# Patient Record
Sex: FEMALE | Race: WHITE | NOT HISPANIC OR LATINO | Employment: PART TIME | URBAN - METROPOLITAN AREA
[De-identification: names, ages, dates, MRNs, and addresses within clinical notes are randomized per-mention and may not be internally consistent; named-entity substitution may affect disease eponyms.]

---

## 2017-11-03 ENCOUNTER — OFFICE VISIT (OUTPATIENT)
Dept: URGENT CARE | Facility: CLINIC | Age: 37
End: 2017-11-03
Payer: COMMERCIAL

## 2017-11-03 PROCEDURE — 99203 OFFICE O/P NEW LOW 30 MIN: CPT

## 2018-01-18 NOTE — PROGRESS NOTES
Assessment    1  Acute sinusitis (461 9) (J01 90)   2  Breast pain, right (051 71) (N64 4)    Plan  Acute sinusitis    · Amoxicillin 500 MG Oral Capsule; TAKE 1 CAPSULE 3 TIMES DAILY UNTIL GONE    Discussion/Summary  Discussion Summary:   Take antibiotic as prescribed  follow up with ob-gyn for breast pain  seek emergent care if breast pain worsens  Medication Side Effects Reviewed: Possible side effects of new medications were reviewed with the patient/guardian today  Understands and agrees with treatment plan: The treatment plan was reviewed with the patient/guardian  The patient/guardian understands and agrees with the treatment plan   Counseling Documentation With Imm: The patient, patient's family was counseled regarding instructions for management, patient and family education, importance of compliance with treatment  Chief Complaint    1  Cold Symptoms  Chief Complaint Free Text Note Form: Patient relates started with right ear feeling clogged for 3 days and sinus symptoms  Denies fever  Also, pain to right nipple  Denies discharge from nipple  History of Present Illness  HPI: 46yo F p/w R ear clogged x 3 days with nasal congestion x 1 week and pain in R nipple x 1 day  Pt denies n/v/d, sob/wheezing, fever/chills, breast discharge, breast swelling, breast redness  Hospital Based Practices Required Assessment: Reason DV Screen not done: family at bedside    Depression And Suicide Screen  Reason suicide screen not done: family at bedside  Prefered Language is  english  Primary Language is  english  Review of Systems  Focused-Female:   Constitutional: No fever, no chills, feels well, no tiredness, no recent weight gain or loss  ENT: earache and nasal discharge, but no nosebleeds, no sore throat, no hearing loss and no hoarseness  Cardiovascular: no complaints of slow or fast heart rate, no chest pain, no palpitations, no leg claudication or lower extremity edema     Respiratory: no complaints of shortness of breath, no wheezing, no dyspnea on exertion, no orthopnea or PND  Breasts: breast pain, but no breast swelling, no reddening of the breast, no breast lump, no breast itching and no nipple discharge  Gastrointestinal: no complaints of abdominal pain, no constipation, no nausea or diarrhea, no vomiting, no bloody stools  Genitourinary: no complaints of dysuria, no incontinence, no pelvic pain, no dysmenorrhea, no vaginal discharge or abnormal vaginal bleeding  Musculoskeletal: no complaints of arthralgia, no myalgia, no joint swelling or stiffness, no limb pain or swelling  Integumentary: no complaints of skin rash or lesion, no itching or dry skin, no skin wounds  Neurological: no complaints of headache, no confusion, no numbness or tingling, no dizziness or fainting  ROS Reviewed:   ROS reviewed  Active Problems    1  Acute sinusitis (461 9) (J01 90)   2  Breast pain, right (611 71) (N64 4)    Past Medical History    1  No pertinent past medical history  Active Problems And Past Medical History Reviewed: The active problems and past medical history were reviewed and updated today  Family History  Family History Reviewed: The family history was reviewed and updated today  Social History    · Never a smoker  Social History Reviewed: The social history was reviewed and is unchanged  Surgical History    1  History of Inguinal Hernia Repair  Surgical History Reviewed: The surgical history was reviewed and updated today  Current Meds   1  No Reported Medications Recorded  Medication List Reviewed: The medication list was reviewed and updated today  Allergies    1   Lexapro TABS   2  Zoloft TABS    Vitals  Signs   Recorded: [id number] 04:35PM   Temperature: 98 3 F, Tympanic  Heart Rate: 78  Respiration: 18  Systolic: 288, LUE, Sitting  Diastolic: 60, LUE, Sitting  Height: 5 ft 7 in  Weight: 142 lb 4 oz  BMI Calculated: 22 28  BSA Calculated: 1 75  O2 Saturation: 98, RA  Pain Scale: 5    Physical Exam    Constitutional   General appearance: No acute distress, well appearing and well nourished  Ears, Nose, Mouth, and Throat   External inspection of ears and nose: Normal     Otoscopic examination: Tympanic membranes translucent with normal light reflex  Canals patent without erythema  Nasal mucosa, septum, and turbinates: Abnormal   normal nasal septum, no intranasal masses or polyps and normal nasal turbinates  There was a purulent discharge from both nares  The bilateral nasal mucosa was edematous  Oropharynx: Normal with no erythema, edema, exudate or lesions  Pulmonary   Respiratory effort: No increased work of breathing or signs of respiratory distress  Auscultation of lungs: Clear to auscultation  no rales or crackles were heard bilaterally  no rhonchi  no friction rub  no wheezing  no diminished breath sounds  Cardiovascular   Palpation of heart: Normal PMI, no thrills  Auscultation of heart: Normal rate and rhythm, normal S1 and S2, without murmurs  Examination of extremities for edema and/or varicosities: Normal     Abdomen   Abdomen: Non-tender, no masses  Liver and spleen: No hepatomegaly or splenomegaly  Lymphatic   Palpation of lymph nodes in neck: Abnormal   bilateral anterior cervical node enlargement, but no posterior cervical node enlargement  Musculoskeletal   Gait and station: Normal     Digits and nails: Normal without clubbing or cyanosis  Inspection/palpation of joints, bones, and muscles: Normal     Skin   Skin and subcutaneous tissue: Normal without rashes or lesions  Psychiatric   Orientation to person, place, and time: Normal     Mood and affect: Normal     Additional Exam:  breast exam normal; TTP beneath nipple; no palpable masses; no enlarged or tender lymph nodes; no erythema/edema        Signatures   Electronically signed by : RUFUS Grubbs; Nov  3 2017  5:16PM EST (Author)    Electronically signed by : MARISOL Gurrola ; Nov 6 2017 10:32AM EST                       (Co-author)

## 2020-06-28 ENCOUNTER — HOSPITAL ENCOUNTER (INPATIENT)
Facility: HOSPITAL | Age: 40
LOS: 1 days | Discharge: HOME | DRG: 603 | End: 2020-06-30
Attending: EMERGENCY MEDICINE | Admitting: INTERNAL MEDICINE
Payer: COMMERCIAL

## 2020-06-28 ENCOUNTER — APPOINTMENT (EMERGENCY)
Dept: ULTRASOUND IMAGING | Facility: HOSPITAL | Age: 40
DRG: 603 | End: 2020-06-28
Payer: COMMERCIAL

## 2020-06-28 DIAGNOSIS — L03.116 CELLULITIS OF LEFT LOWER EXTREMITY: Primary | ICD-10-CM

## 2020-06-28 PROBLEM — L03.90 CELLULITIS: Status: ACTIVE | Noted: 2020-06-28

## 2020-06-28 LAB
ANION GAP SERPL CALCULATED.3IONS-SCNC: 7 MMOL/L (ref 4–13)
BASOPHILS # BLD AUTO: 0.03 THOUSANDS/ΜL (ref 0–0.1)
BASOPHILS NFR BLD AUTO: 1 % (ref 0–1)
BUN SERPL-MCNC: 16 MG/DL (ref 5–25)
CALCIUM SERPL-MCNC: 9 MG/DL (ref 8.3–10.1)
CHLORIDE SERPL-SCNC: 104 MMOL/L (ref 100–108)
CO2 SERPL-SCNC: 29 MMOL/L (ref 21–32)
CREAT SERPL-MCNC: 0.78 MG/DL (ref 0.6–1.3)
EOSINOPHIL # BLD AUTO: 0.03 THOUSAND/ΜL (ref 0–0.61)
EOSINOPHIL NFR BLD AUTO: 1 % (ref 0–6)
ERYTHROCYTE [DISTWIDTH] IN BLOOD BY AUTOMATED COUNT: 12.2 % (ref 11.6–15.1)
EXT PREG TEST URINE: NEGATIVE
EXT. CONTROL ED NAV: NORMAL
GFR SERPL CREATININE-BSD FRML MDRD: 95 ML/MIN/1.73SQ M
GLUCOSE SERPL-MCNC: 96 MG/DL (ref 65–140)
HCT VFR BLD AUTO: 41.1 % (ref 34.8–46.1)
HGB BLD-MCNC: 13.7 G/DL (ref 11.5–15.4)
IMM GRANULOCYTES # BLD AUTO: 0.02 THOUSAND/UL (ref 0–0.2)
IMM GRANULOCYTES NFR BLD AUTO: 0 % (ref 0–2)
LACTATE SERPL-SCNC: 0.8 MMOL/L (ref 0.5–2)
LYMPHOCYTES # BLD AUTO: 0.85 THOUSANDS/ΜL (ref 0.6–4.47)
LYMPHOCYTES NFR BLD AUTO: 19 % (ref 14–44)
MCH RBC QN AUTO: 29.3 PG (ref 26.8–34.3)
MCHC RBC AUTO-ENTMCNC: 33.3 G/DL (ref 31.4–37.4)
MCV RBC AUTO: 88 FL (ref 82–98)
MONOCYTES # BLD AUTO: 0.52 THOUSAND/ΜL (ref 0.17–1.22)
MONOCYTES NFR BLD AUTO: 12 % (ref 4–12)
NEUTROPHILS # BLD AUTO: 3.06 THOUSANDS/ΜL (ref 1.85–7.62)
NEUTS SEG NFR BLD AUTO: 67 % (ref 43–75)
NRBC BLD AUTO-RTO: 0 /100 WBCS
PLATELET # BLD AUTO: 172 THOUSANDS/UL (ref 149–390)
PMV BLD AUTO: 9.5 FL (ref 8.9–12.7)
POTASSIUM SERPL-SCNC: 4.1 MMOL/L (ref 3.5–5.3)
RBC # BLD AUTO: 4.67 MILLION/UL (ref 3.81–5.12)
SODIUM SERPL-SCNC: 140 MMOL/L (ref 136–145)
WBC # BLD AUTO: 4.51 THOUSAND/UL (ref 4.31–10.16)

## 2020-06-28 PROCEDURE — 96365 THER/PROPH/DIAG IV INF INIT: CPT

## 2020-06-28 PROCEDURE — 81025 URINE PREGNANCY TEST: CPT | Performed by: EMERGENCY MEDICINE

## 2020-06-28 PROCEDURE — 85025 COMPLETE CBC W/AUTO DIFF WBC: CPT | Performed by: EMERGENCY MEDICINE

## 2020-06-28 PROCEDURE — 76882 US LMTD JT/FCL EVL NVASC XTR: CPT

## 2020-06-28 PROCEDURE — 87040 BLOOD CULTURE FOR BACTERIA: CPT | Performed by: EMERGENCY MEDICINE

## 2020-06-28 PROCEDURE — 83605 ASSAY OF LACTIC ACID: CPT | Performed by: EMERGENCY MEDICINE

## 2020-06-28 PROCEDURE — 99220 PR INITIAL OBSERVATION CARE/DAY 70 MINUTES: CPT | Performed by: INTERNAL MEDICINE

## 2020-06-28 PROCEDURE — 80048 BASIC METABOLIC PNL TOTAL CA: CPT | Performed by: EMERGENCY MEDICINE

## 2020-06-28 PROCEDURE — 36415 COLL VENOUS BLD VENIPUNCTURE: CPT | Performed by: EMERGENCY MEDICINE

## 2020-06-28 PROCEDURE — 99285 EMERGENCY DEPT VISIT HI MDM: CPT | Performed by: EMERGENCY MEDICINE

## 2020-06-28 PROCEDURE — 99285 EMERGENCY DEPT VISIT HI MDM: CPT

## 2020-06-28 PROCEDURE — 96367 TX/PROPH/DG ADDL SEQ IV INF: CPT

## 2020-06-28 RX ORDER — ACETAMINOPHEN 325 MG/1
650 TABLET ORAL ONCE AS NEEDED
Status: DISCONTINUED | OUTPATIENT
Start: 2020-06-28 | End: 2020-06-28

## 2020-06-28 RX ORDER — IBUPROFEN 600 MG/1
600 TABLET ORAL ONCE
Status: COMPLETED | OUTPATIENT
Start: 2020-06-29 | End: 2020-06-29

## 2020-06-28 RX ORDER — DIPHENHYDRAMINE HYDROCHLORIDE 50 MG/ML
25 INJECTION INTRAMUSCULAR; INTRAVENOUS ONCE
Status: COMPLETED | OUTPATIENT
Start: 2020-06-28 | End: 2020-06-28

## 2020-06-28 RX ORDER — CEFAZOLIN SODIUM 1 G/50ML
1000 SOLUTION INTRAVENOUS EVERY 8 HOURS
Status: DISCONTINUED | OUTPATIENT
Start: 2020-06-28 | End: 2020-06-30 | Stop reason: HOSPADM

## 2020-06-28 RX ORDER — SODIUM CHLORIDE 9 MG/ML
75 INJECTION, SOLUTION INTRAVENOUS CONTINUOUS
Status: DISCONTINUED | OUTPATIENT
Start: 2020-06-28 | End: 2020-06-29

## 2020-06-28 RX ORDER — OXYCODONE HYDROCHLORIDE 5 MG/1
5 TABLET ORAL EVERY 6 HOURS PRN
Status: DISCONTINUED | OUTPATIENT
Start: 2020-06-28 | End: 2020-06-30 | Stop reason: HOSPADM

## 2020-06-28 RX ORDER — CLINDAMYCIN PHOSPHATE 600 MG/50ML
600 INJECTION INTRAVENOUS ONCE
Status: COMPLETED | OUTPATIENT
Start: 2020-06-28 | End: 2020-06-28

## 2020-06-28 RX ORDER — VANCOMYCIN HYDROCHLORIDE 1 G/200ML
15 INJECTION, SOLUTION INTRAVENOUS ONCE
Status: COMPLETED | OUTPATIENT
Start: 2020-06-28 | End: 2020-06-28

## 2020-06-28 RX ORDER — HYDROMORPHONE HCL/PF 1 MG/ML
0.5 SYRINGE (ML) INJECTION EVERY 6 HOURS PRN
Status: DISCONTINUED | OUTPATIENT
Start: 2020-06-28 | End: 2020-06-29

## 2020-06-28 RX ORDER — HEPARIN SODIUM 5000 [USP'U]/ML
5000 INJECTION, SOLUTION INTRAVENOUS; SUBCUTANEOUS EVERY 8 HOURS SCHEDULED
Status: DISCONTINUED | OUTPATIENT
Start: 2020-06-28 | End: 2020-06-30 | Stop reason: HOSPADM

## 2020-06-28 RX ORDER — ACETAMINOPHEN 325 MG/1
650 TABLET ORAL EVERY 6 HOURS PRN
Status: DISCONTINUED | OUTPATIENT
Start: 2020-06-28 | End: 2020-06-30 | Stop reason: HOSPADM

## 2020-06-28 RX ADMIN — CEFAZOLIN SODIUM 1000 MG: 1 SOLUTION INTRAVENOUS at 14:08

## 2020-06-28 RX ADMIN — HYDROMORPHONE HYDROCHLORIDE 0.5 MG: 1 INJECTION, SOLUTION INTRAMUSCULAR; INTRAVENOUS; SUBCUTANEOUS at 19:43

## 2020-06-28 RX ADMIN — CLINDAMYCIN PHOSPHATE 600 MG: 600 INJECTION, SOLUTION INTRAVENOUS at 10:13

## 2020-06-28 RX ADMIN — DIPHENHYDRAMINE HYDROCHLORIDE 25 MG: 50 INJECTION, SOLUTION INTRAMUSCULAR; INTRAVENOUS at 23:32

## 2020-06-28 RX ADMIN — SODIUM CHLORIDE 1000 ML: 0.9 INJECTION, SOLUTION INTRAVENOUS at 09:45

## 2020-06-28 RX ADMIN — SODIUM CHLORIDE 75 ML/HR: 0.9 INJECTION, SOLUTION INTRAVENOUS at 23:32

## 2020-06-28 RX ADMIN — CEFAZOLIN SODIUM 1000 MG: 1 SOLUTION INTRAVENOUS at 21:06

## 2020-06-28 RX ADMIN — ACETAMINOPHEN 650 MG: 325 TABLET ORAL at 13:41

## 2020-06-28 RX ADMIN — VANCOMYCIN HYDROCHLORIDE 1000 MG: 1 INJECTION, SOLUTION INTRAVENOUS at 10:42

## 2020-06-28 RX ADMIN — OXYCODONE HYDROCHLORIDE 5 MG: 5 TABLET ORAL at 16:36

## 2020-06-28 RX ADMIN — SODIUM CHLORIDE 75 ML/HR: 0.9 INJECTION, SOLUTION INTRAVENOUS at 12:34

## 2020-06-28 RX ADMIN — VANCOMYCIN HYDROCHLORIDE 1250 MG: 5 INJECTION, POWDER, LYOPHILIZED, FOR SOLUTION INTRAVENOUS at 22:20

## 2020-06-28 NOTE — PROGRESS NOTES
Vancomycin Assessment    Hansa Paez is a 36 y o  female who is currently receiving vancomycin 1250 mg IV every 12 hours for cellulitis  Relevant clinical data and objective history reviewed:  Creatinine   Date Value Ref Range Status   06/28/2020 0 78 0 60 - 1 30 mg/dL Final     Comment:     Standardized to IDMS reference method     /76   Pulse 78   Temp 98 °F (36 7 °C) (Temporal)   Resp 18   Ht 5' 7" (1 702 m)   Wt 65 8 kg (145 lb)   LMP  (LMP Unknown)   SpO2 98%   BMI 22 71 kg/m²   No intake/output data recorded  Lab Results   Component Value Date/Time    BUN 16 06/28/2020 09:44 AM    WBC 4 51 06/28/2020 09:44 AM    HGB 13 7 06/28/2020 09:44 AM    HCT 41 1 06/28/2020 09:44 AM    MCV 88 06/28/2020 09:44 AM     06/28/2020 09:44 AM     Temp Readings from Last 3 Encounters:   06/28/20 98 °F (36 7 °C) (Temporal)     Vancomycin Days of Therapy: 1    Assessment/Plan  The patient is currently on vancomycin utilizing scheduled dosing based on actual body weight  Baseline risks associated with therapy include: concomitant nephrotoxic medications and dehydration  The patient received a one time dose of 1000 mg IV in the ED and will now receive 1250 mg IV every 12 hours  Pharmacy will also follow closely for s/sx of nephrotoxicity, infusion reactions and appropriateness of therapy  BMP and CBC will be ordered per protocol  Plan for trough as patient approaches steady state, prior to the 5th  dose at approximately 1030 on 6/30/20  Due to infection severity, will target a trough of 10-15  Pharmacy will continue to follow the patients culture results and clinical progress daily      Delonte Padgett, Pharmacist

## 2020-06-28 NOTE — ED PROVIDER NOTES
History  Chief Complaint   Patient presents with    Cellulitis     patient has cellulitis on her right thigh for about a week, was seen at urgent care and given abx  followed up with them today and told to come to the ED for evalution  24-year-old female with a past medical history of cellulitis presents with worsening cellulitis to left leg x1 week  Patient states that about a week ago she was playing in her child's backyard pool  The next day, she noticed a small pimple to the left medial proximal thigh  Pimple and grew in size and she thought perhaps it was from an ingrown hair  She saw her primary care provider on June 24th and was prescribed Bactrim for 10 days  She was subsequently switched to Keflex and was seen at the urgent care today for worsening cellulitis and was sent to the emergency department for further evaluation  Patient denies fever, chills, nausea, vomiting, chest pain or shortness of breath  Patient has not had MRSA abscesses or cellulitis before  Dr Nitin Grady wore PPE during clinical evaluation including face mask, eye goggles and gloves        History provided by:  Patient   used: No    Rash   Location:  Leg  Leg rash location:  L upper leg  Quality: draining, painful and redness    Quality: not blistering, not bruising, not burning, not dry, not itchy, not peeling, not scaling, not swelling and not weeping    Pain details:     Quality:  Throbbing    Severity:  Moderate    Onset quality:  Gradual    Duration:  1 week    Timing:  Intermittent    Progression:  Waxing and waning  Severity:  Moderate  Onset quality:  Gradual  Duration:  1 week  Timing:  Constant  Progression:  Spreading  Chronicity:  New  Context: not animal contact, not chemical exposure, not diapers, not eggs, not exposure to similar rash, not food, not hot tub use, not insect bite/sting, not medications, not new detergent/soap, not nuts, not plant contact, not pollen, not pregnancy, not sick contacts and not sun exposure    Relieved by:  Nothing  Worsened by:  Contact and heat  Associated symptoms: induration    Associated symptoms: no abdominal pain, no diarrhea, no fatigue, no fever, no headaches, no hoarse voice, no joint pain, no myalgias, no nausea, no periorbital edema, no shortness of breath, no sore throat, no throat swelling, no tongue swelling, no URI, not vomiting and not wheezing        None       History reviewed  No pertinent past medical history  Past Surgical History:   Procedure Laterality Date    TUBAL LIGATION         History reviewed  No pertinent family history  I have reviewed and agree with the history as documented  E-Cigarette/Vaping    E-Cigarette Use Never User      E-Cigarette/Vaping Substances     Social History     Tobacco Use    Smoking status: Never Smoker    Smokeless tobacco: Never Used   Substance Use Topics    Alcohol use: Never     Frequency: Never    Drug use: Never       Review of Systems   Constitutional: Negative for chills, diaphoresis, fatigue and fever  HENT: Negative for congestion, drooling, facial swelling, hoarse voice, nosebleeds, sneezing, sore throat, trouble swallowing and voice change  Eyes: Negative for photophobia, pain and visual disturbance  Respiratory: Negative for cough, chest tightness, shortness of breath and wheezing  Cardiovascular: Negative for chest pain, palpitations and leg swelling  Gastrointestinal: Negative for abdominal pain, constipation, diarrhea, nausea and vomiting  Genitourinary: Negative for decreased urine volume, difficulty urinating, dysuria, flank pain, frequency, hematuria, urgency, vaginal bleeding and vaginal discharge  Musculoskeletal: Negative for arthralgias, back pain, myalgias, neck pain and neck stiffness  Skin: Positive for color change  Negative for pallor, rash and wound  Allergic/Immunologic: Negative for immunocompromised state     Neurological: Negative for dizziness, tremors, seizures, syncope, facial asymmetry, speech difficulty, weakness, light-headedness, numbness and headaches  Hematological: Negative for adenopathy  Psychiatric/Behavioral: Negative for agitation, confusion, hallucinations and suicidal ideas  The patient is not nervous/anxious  Physical Exam  Physical Exam   Constitutional: She is oriented to person, place, and time  Vital signs are normal  She appears well-developed and well-nourished  She is cooperative  Non-toxic appearance  She does not have a sickly appearance  She does not appear ill  No distress  HENT:   Head: Normocephalic and atraumatic  Right Ear: Hearing, tympanic membrane, external ear and ear canal normal    Left Ear: Hearing, tympanic membrane, external ear and ear canal normal    Nose: Nose normal  Right sinus exhibits no maxillary sinus tenderness and no frontal sinus tenderness  Left sinus exhibits no maxillary sinus tenderness and no frontal sinus tenderness  Mouth/Throat: Uvula is midline, oropharynx is clear and moist and mucous membranes are normal  No oropharyngeal exudate, posterior oropharyngeal edema, posterior oropharyngeal erythema or tonsillar abscesses  Eyes: Pupils are equal, round, and reactive to light  Conjunctivae, EOM and lids are normal    Neck: Trachea normal, normal range of motion, full passive range of motion without pain and phonation normal  Neck supple  No thyroid mass and no thyromegaly present  Cardiovascular: Normal rate, regular rhythm, S1 normal, S2 normal, normal heart sounds, intact distal pulses and normal pulses  Pulses:       Radial pulses are 2+ on the right side, and 2+ on the left side  Dorsalis pedis pulses are 2+ on the right side, and 2+ on the left side  Pulmonary/Chest: Effort normal and breath sounds normal  No accessory muscle usage or stridor  No tachypnea  No respiratory distress  She has no decreased breath sounds  She has no wheezes  She has no rhonchi   She has no rales  She exhibits no mass, no tenderness, no deformity and no retraction  Abdominal: Soft  Bowel sounds are normal  She exhibits no distension, no ascites and no mass  There is no hepatosplenomegaly  There is no tenderness  There is no rigidity, no rebound, no guarding, no CVA tenderness, no tenderness at McBurney's point and negative Carrasquillo's sign  No hernia  Hernia confirmed negative in the right inguinal area and confirmed negative in the left inguinal area  Genitourinary:       Pelvic exam was performed with patient supine  Genitourinary Comments: Large area of erythema with central area of induration, tenderness and drainage to left proximal medial thigh  Area of cellulitis marked with a pen  Yellowish discharge on gauze  Musculoskeletal: Normal range of motion  She exhibits no edema, tenderness or deformity  Lymphadenopathy:     She has no cervical adenopathy  No inguinal adenopathy noted on the right or left side  Neurological: She is alert and oriented to person, place, and time  She has normal strength  She is not disoriented  She displays no atrophy and no tremor  No cranial nerve deficit or sensory deficit  She exhibits normal muscle tone  She displays a negative Romberg sign  She displays no seizure activity  Coordination and gait normal  GCS eye subscore is 4  GCS verbal subscore is 5  GCS motor subscore is 6  Patient is AAOx4, GCS 15; speaking clearly and appropriately; motor and sensation intact; visual fields intact; cranial nerves II-XII grossly intact; no facial droop, slurred speech or arm drift   Skin: Skin is warm, dry and intact  Capillary refill takes less than 2 seconds  No abrasion, no bruising, no burn, no ecchymosis, no laceration, no lesion, no petechiae and no rash noted  Rash is not maculopapular  She is not diaphoretic  There is erythema  No pallor  Psychiatric: She has a normal mood and affect   Her speech is normal and behavior is normal  Judgment and thought content normal  She is not actively hallucinating  Cognition and memory are normal  She is attentive  Nursing note and vitals reviewed        Vital Signs  ED Triage Vitals   Temperature Pulse Respirations Blood Pressure SpO2   06/28/20 0913 06/28/20 0913 06/28/20 0913 06/28/20 0913 06/28/20 0914   97 5 °F (36 4 °C) 88 16 118/68 97 %      Temp Source Heart Rate Source Patient Position - Orthostatic VS BP Location FiO2 (%)   06/28/20 0913 06/28/20 0913 06/28/20 0914 06/28/20 0914 --   Temporal Monitor Sitting Right arm       Pain Score       06/28/20 0914       3           Vitals:    06/28/20 1145 06/28/20 1200 06/28/20 1220 06/28/20 1539   BP:  123/74 117/76 114/77   Pulse: 84 94 78 92   Patient Position - Orthostatic VS:  Lying           Visual Acuity      ED Medications  Medications   sodium chloride 0 9 % infusion (75 mL/hr Intravenous New Bag 6/28/20 1234)   vancomycin (VANCOCIN) 1,250 mg in sodium chloride 0 9 % 250 mL IVPB (has no administration in time range)   ceFAZolin (ANCEF) IVPB (premix) 1,000 mg 50 mL (1,000 mg Intravenous New Bag 6/28/20 1408)   acetaminophen (TYLENOL) tablet 650 mg (650 mg Oral Given 6/28/20 1341)   heparin (porcine) subcutaneous injection 5,000 Units (5,000 Units Subcutaneous Refused 6/28/20 1342)   oxyCODONE (ROXICODONE) IR tablet 5 mg (5 mg Oral Given 6/28/20 1636)   HYDROmorphone (DILAUDID) injection 0 5 mg (has no administration in time range)   sodium chloride 0 9 % bolus 1,000 mL (0 mL Intravenous Stopped 6/28/20 1018)   vancomycin (VANCOCIN) IVPB (premix) 1,000 mg 200 mL (0 mg/kg × 65 8 kg Intravenous Stopped 6/28/20 1131)   clindamycin (CLEOCIN) IVPB (premix) 600 mg 50 mL (0 mg Intravenous Stopped 6/28/20 1042)       Diagnostic Studies  Results Reviewed     Procedure Component Value Units Date/Time    Blood culture #1 [[id number]] Collected:  06/28/20 0944    Lab Status:  Preliminary result Specimen:  Blood from Arm, Left Updated:  06/28/20 1601     Blood Culture Received in Microbiology Lab  Culture in Progress  Blood culture #2 [[id number]] Collected:  06/28/20 0944    Lab Status:  Preliminary result Specimen:  Blood from Arm, Right Updated:  06/28/20 1601     Blood Culture Received in Microbiology Lab  Culture in Progress  POCT pregnancy, urine [[id number]]  (Normal) Resulted:  06/28/20 1017    Lab Status:  Final result Updated:  06/28/20 1017     EXT PREG TEST UR (Ref: Negative) negative     Control valid    Lactic acid, plasma [[id number]]  (Normal) Collected:  06/28/20 0944    Lab Status:  Final result Specimen:  Blood from Arm, Right Updated:  06/28/20 1010     LACTIC ACID 0 8 mmol/L     Narrative:       Result may be elevated if tourniquet was used during collection      Basic metabolic panel [[id number]] Collected:  06/28/20 0944    Lab Status:  Final result Specimen:  Blood from Arm, Right Updated:  06/28/20 1000     Sodium 140 mmol/L      Potassium 4 1 mmol/L      Chloride 104 mmol/L      CO2 29 mmol/L      ANION GAP 7 mmol/L      BUN 16 mg/dL      Creatinine 0 78 mg/dL      Glucose 96 mg/dL      Calcium 9 0 mg/dL      eGFR 95 ml/min/1 73sq m     Narrative:       [place] guidelines for Chronic Kidney Disease (CKD):     Stage 1 with normal or high GFR (GFR > 90 mL/min/1 73 square meters)    Stage 2 Mild CKD (GFR = 60-89 mL/min/1 73 square meters)    Stage 3A Moderate CKD (GFR = 45-59 mL/min/1 73 square meters)    Stage 3B Moderate CKD (GFR = 30-44 mL/min/1 73 square meters)    Stage 4 Severe CKD (GFR = 15-29 mL/min/1 73 square meters)    Stage 5 End Stage CKD (GFR <15 mL/min/1 73 square meters)  Note: GFR calculation is accurate only with a steady state creatinine    CBC and differential [[id number]] Collected:  06/28/20 0944    Lab Status:  Final result Specimen:  Blood from Arm, Right Updated:  06/28/20 0951     WBC 4 51 Thousand/uL      RBC 4 67 Million/uL      Hemoglobin 13 7 g/dL      Hematocrit 41 1 %      MCV 88 fL      MCH 29 3 pg MCHC 33 3 g/dL      RDW 12 2 %      MPV 9 5 fL      Platelets 397 Thousands/uL      nRBC 0 /100 WBCs      Neutrophils Relative 67 %      Immat GRANS % 0 %      Lymphocytes Relative 19 %      Monocytes Relative 12 %      Eosinophils Relative 1 %      Basophils Relative 1 %      Neutrophils Absolute 3 06 Thousands/µL      Immature Grans Absolute 0 02 Thousand/uL      Lymphocytes Absolute 0 85 Thousands/µL      Monocytes Absolute 0 52 Thousand/µL      Eosinophils Absolute 0 03 Thousand/µL      Basophils Absolute 0 03 Thousands/µL                  US extremity soft tissue   Final Result by Abhi Emanuel MD (06/28 1243)      Diffuse soft tissue swelling corresponding to area of clinical concern but no evidence of organized fluid collection to suggest focal abscess         Workstation performed: [id number]                    Procedures  POC MSK/Soft Tissue US  Date/Time: 6/28/2020 11:35 AM  Performed by: Leandro Raza MD  Authorized by: Leandro Raza MD     Patient location:  Bedside  Performed by: Attending  Other Assisting Provider: No    Procedure:     Performed: soft tissue ultrasound    Procedure details:     Exam Type:  Diagnostic    Longitudinal view:  Obtained    Transverse view:  Obtained    Image quality: diagnostic      Image availability:  Not saved  Soft tissue ultrasound:     Soft tissue indications: swelling, pain and erythema      Anatomic location:  Lower extremity    Soft tissue findings: cobblestoning    Interpretation:     Soft tissue impressions: consistent with cellulitis               ED Course  ED Course as of Jun 28 1640   Sun Jun 28, 2020   0958 Spoke with Dr Leanna Montelongo, Radiologist who approves US soft tissues to rule out abscess  Magali Royal 182 with Farzaneh Ward (cell [phone]) who will come to do ultrasound  Will be an hour or so as she comes from [place] [id number] Hcg negative      80 Texted Dr Macy Hines, Hospitalist for MS observation for worsening cellulitis of E and possible abscess  US soft tissue ordered  May need Surgery consult  IV clindamycin and vancomycin ordered in ED       1057 Dr Sariah Pereyra accepts patient for failed outpatient antibiotics for cellulitis of LLE  Aware that patient will get a US soft tissues and may need Surgery consult for I&D       1100 Updated patient on plan for admission  3000 [facility] at bedside  700 [facility] she did not see abscess, only severe cellulitis which is consistent with my bedside ultrasound  There was significant "cobblestoning" of epidermis        [address] soft tissue:IMPRESSION:     Diffuse soft tissue swelling corresponding to area of clinical concern but no evidence of organized fluid collection to suggest focal abscess             MDM  Number of Diagnoses or Management Options  Cellulitis of left lower extremity:      Amount and/or Complexity of Data Reviewed  Clinical lab tests: reviewed and ordered  Tests in the radiology section of CPT®: reviewed and ordered  Tests in the medicine section of CPT®: ordered and reviewed  Review and summarize past medical records: yes  Discuss the patient with other providers: yes (Hospitalist, Dr Aaron Mendoza)  Independent visualization of images, tracings, or specimens: yes (Ultrasound soft tissue)    Risk of Complications, Morbidity, and/or Mortality  Presenting problems: moderate  Diagnostic procedures: moderate  Management options: moderate    Patient Progress  Patient progress: stable        Disposition  Final diagnoses:   Cellulitis of left lower extremity     Time reflects when diagnosis was documented in both MDM as applicable and the Disposition within this note     Time User Action Codes Description Comment    6/28/2020 10:06 AM Arnie Zhao Add [L03 116] Cellulitis of left lower extremity       ED Disposition     ED Disposition Condition Date/Time Comment    Admit Stable Sun Jun 28, 2020 10:06 AM Case was discussed with Dr Sariah Pereyra and the patient's admission status was agreed to be Admission Status: observation status to the service of Dr Sheikh Shows          Follow-up Information    None         There are no discharge medications for this patient  No discharge procedures on file      PDMP Review     None          ED Provider  Electronically Signed by      MD Paddy Springer MD  06/28/20 1640

## 2020-06-28 NOTE — H&P
H&P- Eileen Ace 1980, 36 y o  female MRN: [id number]    Unit/Bed#: -01 Encounter: [id number]    Primary Care Provider: Bernard Hopkins MD   Date and time admitted to hospital: 6/28/2020  9:08 AM    * Cellulitis  Assessment & Plan  Presents with left perineal region cellulitis  Ultrasound done and showed no evidence of abscess collection  Continue with cefazolin and vancomycin  Tylenol for pain  Continue gentle hydration  Arnie cellulitic region and follow daily    VTE Prophylaxis: Heparin  / sequential compression device   Code Status:  Full  POLST: There is no POLST form on file for this patient (pre-hospital)    Anticipated Length of Stay:  Patient will be admitted on an Observation basis with an anticipated length of stay of  less than 2 midnights  Justification for Hospital Stay:  Cellulitis    Total Time for Visit, including Counseling / Coordination of Care: 45 minutes  Greater than 50% of this total time spent on direct patient counseling and coordination of care  History of Present Illness:    Eileen Ace is a 36 y o  female who presents with left groin region swelling and erythema for the last 1 week  Patient was being treated for left perineal region cellulitis with clindamycin and Bactrim as an outpatient to no avail  Denies fever, chills  Review of Systems:    Review of Systems    Past Medical and Surgical History:     History reviewed  No pertinent past medical history  Past Surgical History:   Procedure Laterality Date    TUBAL LIGATION         Meds/Allergies:    Prior to Admission medications    Not on File     I have reviewed home medications with patient personally  Allergies:    Allergies   Allergen Reactions    Escitalopram     Fluoxetine     Sertraline      antidepressants      Vancomycin      Itching scalp    Morphine Rash       Social History:     Substance Use History:   Social History     Substance and Sexual Activity   Alcohol Use Never    Frequency: Never     Social History     Tobacco Use   Smoking Status Never Smoker   Smokeless Tobacco Never Used     Social History     Substance and Sexual Activity   Drug Use Never       Family History:    non-contributory    Physical Exam:     Vitals:   Blood Pressure: 117/76 (06/28/20 1220)  Pulse: 78 (06/28/20 1220)  Temperature: 98 °F (36 7 °C) (06/28/20 1220)  Temp Source: Temporal (06/28/20 1220)  Respirations: 18 (06/28/20 1220)  Height: 5' 7" (170 2 cm) (06/28/20 1220)  Weight - Scale: 65 8 kg (145 lb) (06/28/20 0914)  SpO2: 98 % (06/28/20 1220)    Physical Exam    General appearance: alert, appears stated age and cooperative  Skin: Skin color, texture, turgor normal  No rashes or lesions  Head: Normocephalic, without obvious abnormality, atraumatic  Eyes: conjunctivae/corneas clear  PERRL, EOM's intact  Lungs: clear to auscultation bilaterally  Heart: regular rate and rhythm, S1, S2 normal, no murmur, click, rub or gallop  Abdomen: soft, non-tender; bowel sounds normal; no masses,  no organomegaly  Back: symmetric, no curvature  ROM normal  No CVA tenderness  Extremities: Left perineum erythema and induration, no loculated abscess  Neurologic: Alert and oriented X 3, normal strength and tone  Normal symmetric reflexes  Normal coordination and gait  Psychiatric:  Normal mood and affect    Additional Data:     Lab Results: I have personally reviewed pertinent reports        Results from last 7 days   Lab Units 06/28/20  0944   WBC Thousand/uL 4 51   HEMOGLOBIN g/dL 13 7   HEMATOCRIT % 41 1   PLATELETS Thousands/uL 172   NEUTROS PCT % 67   LYMPHS PCT % 19   MONOS PCT % 12   EOS PCT % 1     Results from last 7 days   Lab Units 06/28/20  0944   SODIUM mmol/L 140   POTASSIUM mmol/L 4 1   CHLORIDE mmol/L 104   CO2 mmol/L 29   BUN mg/dL 16   CREATININE mg/dL 0 78   ANION GAP mmol/L 7   CALCIUM mg/dL 9 0   GLUCOSE RANDOM mg/dL 96                 Results from last 7 days   Lab Units 06/28/20  0944   LACTIC ACID mmol/L 0 8       Imaging: I have personally reviewed pertinent reports  US extremity soft tissue   Final Result by Ronan Wheeler MD (06/28 1243)      Diffuse soft tissue swelling corresponding to area of clinical concern but no evidence of organized fluid collection to suggest focal abscess         Workstation performed: [id number]             EKG, Pathology, and Other Studies Reviewed on Admission: yes    Allscripts / Epic Records Reviewed: Yes     ** Please Note: This note has been constructed using a voice recognition system   **

## 2020-06-28 NOTE — PLAN OF CARE
Problem: SKIN/TISSUE INTEGRITY - ADULT  Goal: Skin integrity remains intact  Description  INTERVENTIONS  - Identify patients at risk for skin breakdown  - Assess and monitor skin integrity  - Assess and monitor nutrition and hydration status  - Monitor labs (i e  albumin)  - Assess for incontinence   - Turn and reposition patient  - Assist with mobility/ambulation  - Relieve pressure over bony prominences  - Avoid friction and shearing  - Provide appropriate hygiene as needed including keeping skin clean and dry  - Evaluate need for skin moisturizer/barrier cream  - Collaborate with interdisciplinary team (i e  Nutrition, Rehabilitation, etc )   - Patient/family teaching  Outcome: Progressing  Goal: Incision(s), wounds(s) or drain site(s) healing without S/S of infection  Description  INTERVENTIONS  - Assess and document risk factors for skin impairment   - Assess and document dressing, incision, wound bed, drain sites and surrounding tissue  - Consider nutrition services referral as needed  - Oral mucous membranes remain intact  - Provide patient/ family education  Outcome: Progressing  Goal: Oral mucous membranes remain intact  Description  INTERVENTIONS  - Assess oral mucosa and hygiene practices  - Implement preventative oral hygiene regimen  - Implement oral medicated treatments as ordered  - Initiate Nutrition services referral as needed  Outcome: Progressing     Problem: PAIN - ADULT  Goal: Verbalizes/displays adequate comfort level or baseline comfort level  Description  Interventions:  - Encourage patient to monitor pain and request assistance  - Assess pain using appropriate pain scale  - Administer analgesics based on type and severity of pain and evaluate response  - Implement non-pharmacological measures as appropriate and evaluate response  - Consider cultural and social influences on pain and pain management  - Notify physician/advanced practitioner if interventions unsuccessful or patient reports new pain  Outcome: Progressing     Problem: INFECTION - ADULT  Goal: Absence or prevention of progression during hospitalization  Description  INTERVENTIONS:  - Assess and monitor for signs and symptoms of infection  - Monitor lab/diagnostic results  - Monitor all insertion sites, i e  indwelling lines, tubes, and drains  - Monitor endotracheal if appropriate and nasal secretions for changes in amount and color  - [place] appropriate cooling/warming therapies per order  - Administer medications as ordered  - Instruct and encourage patient and family to use good hand hygiene technique  - Identify and instruct in appropriate isolation precautions for identified infection/condition  Outcome: Progressing  Goal: Absence of fever/infection during neutropenic period  Description  INTERVENTIONS:  - Monitor WBC    Outcome: Progressing     Problem: SAFETY ADULT  Goal: Patient will remain free of falls  Description  INTERVENTIONS:  - Assess patient frequently for physical needs  -  Identify cognitive and physical deficits and behaviors that affect risk of falls    -  [place] fall precautions as indicated by assessment   - Educate patient/family on patient safety including physical limitations  - Instruct patient to call for assistance with activity based on assessment  - Modify environment to reduce risk of injury  - Consider OT/PT consult to assist with strengthening/mobility  Outcome: Progressing  Goal: Maintain or return to baseline ADL function  Description  INTERVENTIONS:  -  Assess patient's ability to carry out ADLs; assess patient's baseline for ADL function and identify physical deficits which impact ability to perform ADLs (bathing, care of mouth/teeth, toileting, grooming, dressing, etc )  - Assess/evaluate cause of self-care deficits   - Assess range of motion  - Assess patient's mobility; develop plan if impaired  - Assess patient's need for assistive devices and provide as appropriate  - Encourage maximum independence but intervene and supervise when necessary  - Involve family in performance of ADLs  - Assess for home care needs following discharge   - Consider OT consult to assist with ADL evaluation and planning for discharge  - Provide patient education as appropriate  Outcome: Progressing  Goal: Maintain or return mobility status to optimal level  Description  INTERVENTIONS:  - Assess patient's baseline mobility status (ambulation, transfers, stairs, etc )    - Identify cognitive and physical deficits and behaviors that affect mobility  - Identify mobility aids required to assist with transfers and/or ambulation (gait belt, sit-to-stand, lift, walker, cane, etc )  - [place] fall precautions as indicated by assessment  - Record patient progress and toleration of activity level on Mobility SBAR; progress patient to next Phase/Stage  - Instruct patient to call for assistance with activity based on assessment  - Consider rehabilitation consult to assist with strengthening/weightbearing, etc   Outcome: Progressing

## 2020-06-28 NOTE — ASSESSMENT & PLAN NOTE
Presents with left perineal region cellulitis  Ultrasound done and showed no evidence of abscess collection  Continue with cefazolin and vancomycin  Tylenol for pain  Continue gentle hydration  Arnie cellulitic region and follow daily

## 2020-06-28 NOTE — LETTER
[facility]  [address] [place] [address]  Dept: [phone]    June 30, 2020     Patient: Miguelina Azevedo   YOB: 1980   Date of Visit: 6/28/2020       To Whom it May Concern:    Jeison Pennington is under my professional care  She was seen in the hospital from 6/28/2020   to 06/30/20  She may return to work on 7/6/2020 without limitations  If you have any questions or concerns, please don't hesitate to call           Sincerely,          Shane Barriga MD

## 2020-06-29 LAB
ALBUMIN SERPL BCP-MCNC: 3 G/DL (ref 3.5–5)
ALP SERPL-CCNC: 48 U/L (ref 46–116)
ALT SERPL W P-5'-P-CCNC: 43 U/L (ref 12–78)
ANION GAP SERPL CALCULATED.3IONS-SCNC: 3 MMOL/L (ref 4–13)
AST SERPL W P-5'-P-CCNC: 33 U/L (ref 5–45)
BASOPHILS # BLD AUTO: 0.03 THOUSANDS/ΜL (ref 0–0.1)
BASOPHILS NFR BLD AUTO: 1 % (ref 0–1)
BILIRUB SERPL-MCNC: 0.34 MG/DL (ref 0.2–1)
BUN SERPL-MCNC: 9 MG/DL (ref 5–25)
CALCIUM SERPL-MCNC: 7.9 MG/DL (ref 8.3–10.1)
CHLORIDE SERPL-SCNC: 105 MMOL/L (ref 100–108)
CO2 SERPL-SCNC: 31 MMOL/L (ref 21–32)
CREAT SERPL-MCNC: 0.85 MG/DL (ref 0.6–1.3)
EOSINOPHIL # BLD AUTO: 0.08 THOUSAND/ΜL (ref 0–0.61)
EOSINOPHIL NFR BLD AUTO: 2 % (ref 0–6)
ERYTHROCYTE [DISTWIDTH] IN BLOOD BY AUTOMATED COUNT: 12.1 % (ref 11.6–15.1)
GFR SERPL CREATININE-BSD FRML MDRD: 86 ML/MIN/1.73SQ M
GLUCOSE P FAST SERPL-MCNC: 99 MG/DL (ref 65–99)
GLUCOSE SERPL-MCNC: 99 MG/DL (ref 65–140)
HCT VFR BLD AUTO: 38.4 % (ref 34.8–46.1)
HGB BLD-MCNC: 12.6 G/DL (ref 11.5–15.4)
IMM GRANULOCYTES # BLD AUTO: 0.04 THOUSAND/UL (ref 0–0.2)
IMM GRANULOCYTES NFR BLD AUTO: 1 % (ref 0–2)
LYMPHOCYTES # BLD AUTO: 1.54 THOUSANDS/ΜL (ref 0.6–4.47)
LYMPHOCYTES NFR BLD AUTO: 32 % (ref 14–44)
MAGNESIUM SERPL-MCNC: 1.7 MG/DL (ref 1.6–2.6)
MCH RBC QN AUTO: 29.4 PG (ref 26.8–34.3)
MCHC RBC AUTO-ENTMCNC: 32.8 G/DL (ref 31.4–37.4)
MCV RBC AUTO: 90 FL (ref 82–98)
MONOCYTES # BLD AUTO: 0.69 THOUSAND/ΜL (ref 0.17–1.22)
MONOCYTES NFR BLD AUTO: 15 % (ref 4–12)
NEUTROPHILS # BLD AUTO: 2.38 THOUSANDS/ΜL (ref 1.85–7.62)
NEUTS SEG NFR BLD AUTO: 49 % (ref 43–75)
NRBC BLD AUTO-RTO: 0 /100 WBCS
PLATELET # BLD AUTO: 176 THOUSANDS/UL (ref 149–390)
PMV BLD AUTO: 9.8 FL (ref 8.9–12.7)
POTASSIUM SERPL-SCNC: 4.1 MMOL/L (ref 3.5–5.3)
PROT SERPL-MCNC: 6.4 G/DL (ref 6.4–8.2)
RBC # BLD AUTO: 4.29 MILLION/UL (ref 3.81–5.12)
SODIUM SERPL-SCNC: 139 MMOL/L (ref 136–145)
WBC # BLD AUTO: 4.76 THOUSAND/UL (ref 4.31–10.16)

## 2020-06-29 PROCEDURE — 83735 ASSAY OF MAGNESIUM: CPT | Performed by: INTERNAL MEDICINE

## 2020-06-29 PROCEDURE — 85025 COMPLETE CBC W/AUTO DIFF WBC: CPT | Performed by: INTERNAL MEDICINE

## 2020-06-29 PROCEDURE — 80053 COMPREHEN METABOLIC PANEL: CPT | Performed by: INTERNAL MEDICINE

## 2020-06-29 PROCEDURE — 99226 PR SBSQ OBSERVATION CARE/DAY 35 MINUTES: CPT | Performed by: INTERNAL MEDICINE

## 2020-06-29 RX ORDER — DOXYCYCLINE HYCLATE 100 MG/1
100 CAPSULE ORAL EVERY 12 HOURS SCHEDULED
Status: DISCONTINUED | OUTPATIENT
Start: 2020-06-29 | End: 2020-06-30 | Stop reason: HOSPADM

## 2020-06-29 RX ORDER — HYDROMORPHONE HCL/PF 1 MG/ML
0.5 SYRINGE (ML) INJECTION EVERY 4 HOURS PRN
Status: DISCONTINUED | OUTPATIENT
Start: 2020-06-29 | End: 2020-06-30 | Stop reason: HOSPADM

## 2020-06-29 RX ORDER — IBUPROFEN 600 MG/1
600 TABLET ORAL EVERY 6 HOURS PRN
Status: DISCONTINUED | OUTPATIENT
Start: 2020-06-29 | End: 2020-06-30 | Stop reason: HOSPADM

## 2020-06-29 RX ADMIN — HYDROMORPHONE HYDROCHLORIDE 0.5 MG: 1 INJECTION, SOLUTION INTRAMUSCULAR; INTRAVENOUS; SUBCUTANEOUS at 02:24

## 2020-06-29 RX ADMIN — IBUPROFEN 600 MG: 600 TABLET ORAL at 16:31

## 2020-06-29 RX ADMIN — CEFAZOLIN SODIUM 1000 MG: 1 SOLUTION INTRAVENOUS at 05:48

## 2020-06-29 RX ADMIN — IBUPROFEN 600 MG: 600 TABLET ORAL at 00:39

## 2020-06-29 RX ADMIN — CEFAZOLIN SODIUM 1000 MG: 1 SOLUTION INTRAVENOUS at 14:06

## 2020-06-29 RX ADMIN — DOXYCYCLINE 100 MG: 100 CAPSULE ORAL at 21:16

## 2020-06-29 RX ADMIN — IBUPROFEN 600 MG: 600 TABLET ORAL at 22:35

## 2020-06-29 RX ADMIN — DOXYCYCLINE 100 MG: 100 CAPSULE ORAL at 09:52

## 2020-06-29 RX ADMIN — IBUPROFEN 600 MG: 600 TABLET ORAL at 10:15

## 2020-06-29 RX ADMIN — CEFAZOLIN SODIUM 1000 MG: 1 SOLUTION INTRAVENOUS at 21:16

## 2020-06-29 NOTE — PLAN OF CARE
Problem: SKIN/TISSUE INTEGRITY - ADULT  Goal: Skin integrity remains intact  Description  INTERVENTIONS  - Identify patients at risk for skin breakdown  - Assess and monitor skin integrity  - Assess and monitor nutrition and hydration status  - Monitor labs (i e  albumin)  - Assess for incontinence   - Turn and reposition patient  - Assist with mobility/ambulation  - Relieve pressure over bony prominences  - Avoid friction and shearing  - Provide appropriate hygiene as needed including keeping skin clean and dry  - Evaluate need for skin moisturizer/barrier cream  - Collaborate with interdisciplinary team (i e  Nutrition, Rehabilitation, etc )   - Patient/family teaching  Outcome: Progressing  Goal: Incision(s), wounds(s) or drain site(s) healing without S/S of infection  Description  INTERVENTIONS  - Assess and document risk factors for skin impairment   - Assess and document dressing, incision, wound bed, drain sites and surrounding tissue  - Consider nutrition services referral as needed  - Oral mucous membranes remain intact  - Provide patient/ family education  Outcome: Progressing  Goal: Oral mucous membranes remain intact  Description  INTERVENTIONS  - Assess oral mucosa and hygiene practices  - Implement preventative oral hygiene regimen  - Implement oral medicated treatments as ordered  - Initiate Nutrition services referral as needed  Outcome: Progressing     Problem: PAIN - ADULT  Goal: Verbalizes/displays adequate comfort level or baseline comfort level  Description  Interventions:  - Encourage patient to monitor pain and request assistance  - Assess pain using appropriate pain scale  - Administer analgesics based on type and severity of pain and evaluate response  - Implement non-pharmacological measures as appropriate and evaluate response  - Consider cultural and social influences on pain and pain management  - Notify physician/advanced practitioner if interventions unsuccessful or patient reports new pain  Outcome: Progressing     Problem: INFECTION - ADULT  Goal: Absence or prevention of progression during hospitalization  Description  INTERVENTIONS:  - Assess and monitor for signs and symptoms of infection  - Monitor lab/diagnostic results  - Monitor all insertion sites, i e  indwelling lines, tubes, and drains  - Monitor endotracheal if appropriate and nasal secretions for changes in amount and color  - [place] appropriate cooling/warming therapies per order  - Administer medications as ordered  - Instruct and encourage patient and family to use good hand hygiene technique  - Identify and instruct in appropriate isolation precautions for identified infection/condition  Outcome: Progressing  Goal: Absence of fever/infection during neutropenic period  Description  INTERVENTIONS:  - Monitor WBC    Outcome: Progressing     Problem: SAFETY ADULT  Goal: Patient will remain free of falls  Description  INTERVENTIONS:  - Assess patient frequently for physical needs  -  Identify cognitive and physical deficits and behaviors that affect risk of falls    -  [place] fall precautions as indicated by assessment   - Educate patient/family on patient safety including physical limitations  - Instruct patient to call for assistance with activity based on assessment  - Modify environment to reduce risk of injury  - Consider OT/PT consult to assist with strengthening/mobility  Outcome: Progressing  Goal: Maintain or return to baseline ADL function  Description  INTERVENTIONS:  -  Assess patient's ability to carry out ADLs; assess patient's baseline for ADL function and identify physical deficits which impact ability to perform ADLs (bathing, care of mouth/teeth, toileting, grooming, dressing, etc )  - Assess/evaluate cause of self-care deficits   - Assess range of motion  - Assess patient's mobility; develop plan if impaired  - Assess patient's need for assistive devices and provide as appropriate  - Encourage maximum independence but intervene and supervise when necessary  - Involve family in performance of ADLs  - Assess for home care needs following discharge   - Consider OT consult to assist with ADL evaluation and planning for discharge  - Provide patient education as appropriate  Outcome: Progressing  Goal: Maintain or return mobility status to optimal level  Description  INTERVENTIONS:  - Assess patient's baseline mobility status (ambulation, transfers, stairs, etc )    - Identify cognitive and physical deficits and behaviors that affect mobility  - Identify mobility aids required to assist with transfers and/or ambulation (gait belt, sit-to-stand, lift, walker, cane, etc )  - [place] fall precautions as indicated by assessment  - Record patient progress and toleration of activity level on Mobility SBAR; progress patient to next Phase/Stage  - Instruct patient to call for assistance with activity based on assessment  - Consider rehabilitation consult to assist with strengthening/weightbearing, etc   Outcome: Progressing

## 2020-06-29 NOTE — PROGRESS NOTES
Progress Note - Miguelina Azevedo 1980, 36 y o  female MRN: [id number]    Unit/Bed#: -01 Encounter: [id number]    Primary Care Provider: Ben Armijo MD   Date and time admitted to hospital: 2020  9:08 AM    * Cellulitis  Assessment & Plan  Presents with left perineal region cellulitis  Ultrasound done and showed no evidence of abscess collection  Continue with cefazolin and doxycycline  Patient had rash for vancomycin and discontinued  Cellulitis has significantly improved however pain still persists  Manage pain as per pain scale  DC IV hydration  Arnie cellulitic region and follow daily    VTE Pharmacologic Prophylaxis:   Pharmacologic: Heparin    Patient Centered Rounds: I have performed bedside rounds with nursing staff today    Discussions with Specialists or Other Care Team Provider:     Education and Discussions with Family / Patient:       Current Length of Stay: 0 day(s)    Current Patient Status: Inpatient   Certification Statement: The patient will continue to require additional inpatient hospital stay due to Cellulitis    Discharge Plan:  Tomorrow    Code Status: Level 1 - Full Code      Subjective:   Still complaining of pain however rash has significantly improved    Objective:     Vitals:   Temp (24hrs), Av 5 °F (36 9 °C), Min:97 8 °F (36 6 °C), Max:99 1 °F (37 3 °C)    Temp:  [97 8 °F (36 6 °C)-99 1 °F (37 3 °C)] 97 8 °F (36 6 °C)  HR:  [78-94] 84  Resp:  [18-20] 18  BP: (113-123)/(67-77) 114/67  SpO2:  [97 %-100 %] 98 %  Body mass index is 22 71 kg/m²  Input and Output Summary (last 24 hours):        Intake/Output Summary (Last 24 hours) at 2020 1034  Last data filed at 2020 0947  Gross per 24 hour   Intake  58 ml   Output    Net  58 ml       Physical Exam:     General appearance: alert, appears stated age and cooperative  Head: Normocephalic, without obvious abnormality, atraumatic  Lungs: clear to auscultation bilaterally  Heart: regular rate and rhythm  Abdomen: soft, non-tender, positive bowel sounds   Back: negative  Extremities: extremities atraumatic, no cyanosis or edema  Neurologic: Alert and oriented X 3, normal strength and tone  Normal symmetric reflexes  Normal coordination and gait    Additional Data:     Labs:    Results from last 7 days   Lab Units 06/29/20  0503   WBC Thousand/uL 4 76   HEMOGLOBIN g/dL 12 6   HEMATOCRIT % 38 4   PLATELETS Thousands/uL 176   NEUTROS PCT % 49   LYMPHS PCT % 32   MONOS PCT % 15*   EOS PCT % 2     Results from last 7 days   Lab Units 06/29/20  0503   SODIUM mmol/L 139   POTASSIUM mmol/L 4 1   CHLORIDE mmol/L 105   CO2 mmol/L 31   BUN mg/dL 9   CREATININE mg/dL 0 85   ANION GAP mmol/L 3*   CALCIUM mg/dL 7 9*   ALBUMIN g/dL 3 0*   TOTAL BILIRUBIN mg/dL 0 34   ALK PHOS U/L 48   ALT U/L 43   AST U/L 33   GLUCOSE RANDOM mg/dL 99                 Results from last 7 days   Lab Units 06/28/20  0944   LACTIC ACID mmol/L 0 8           * I Have Reviewed All Lab Data Listed Above  * Additional Pertinent Lab Tests Reviewed: [address] Admission Reviewed    Imaging:    Imaging Reports Reviewed Today Include: images reviewed    Recent Cultures (last 7 days):     Results from last 7 days   Lab Units 06/28/20  0944   BLOOD CULTURE  Received in Microbiology Lab  Culture in Progress  Received in Microbiology Lab  Culture in Progress         Last 24 Hours Medication List:     Current Facility-Administered Medications:  acetaminophen 650 mg Oral Q6H PRN Alethea Armijo MD    cefazolin 1,000 mg Intravenous Q8H Alethea Armijo MD Last Rate: 1,000 mg (06/29/20 0548)   doxycycline hyclate 100 mg Oral Q12H [address] Alethea Armijo MD    heparin (porcine) 5,000 Units Subcutaneous [facility] Alethea Armijo MD    HYDROmorphone 0 5 mg Intravenous Q4H PRN Alethea Armijo MD    ibuprofen 600 mg Oral Q6H PRN Alethea Armijo MD    oxyCODONE 5 mg Oral Q6H PRN Alethea Armijo MD         Today, Patient Was Seen By: Alethea Armijo MD    ** Please Note: Dictation voice to text software may have been used in the creation of this document   **

## 2020-06-29 NOTE — NURSING NOTE
Patient developed hives near IV site during Vancomycin administration  Vancomycin was stopped  Night shift AP was alerted  IV benadryl given  New bag of fluids hung and running

## 2020-06-29 NOTE — ASSESSMENT & PLAN NOTE
Presents with left perineal region cellulitis  Ultrasound done and showed no evidence of abscess collection  Continue with cefazolin and doxycycline  Patient had rash for vancomycin and discontinued  Cellulitis has significantly improved however pain still persists  Manage pain as per pain scale  DC IV hydration  Arnie cellulitic region and follow daily

## 2020-06-29 NOTE — UTILIZATION REVIEW
Initial Clinical Review      OBSERVATION  6-28-20  1125 CHANGED TO INPATIENT 6-29-20  1032 FOR CONTINUED TREATMENT OF CELLULITIS         Start   Ordered   06/29/20 1033  Inpatient Admission Once     Transfer Service: Hospitalist       Question Answer   Admitting Physician Nima De León   Level of Care Med Surg   Estimated length of stay More than 2 Midnights   Certification I certify that inpatient services are medically necessary for this patient for a duration of greater than two midnights  See H&P and MD Progress Notes for additional information about the patient's course of treatment  06/29/20 1032         Admission: Date/Time/Statement: Admission Orders (From admission, onward)     Ordered        06/28/20 1125  Place in Observation (expected length of stay for this patient is less than two midnights)  Once                   Orders Placed This Encounter   Procedures    Place in Observation (expected length of stay for this patient is less than two midnights)     Standing Status:   Standing     Number of Occurrences:   1     Order Specific Question:   Admitting Physician     Answer:   Jose Alejandro Cervantes [[id number]]     Order Specific Question:   Level of Care     Answer:   Med Surg [[id number]]     ED Arrival Information     Expected Arrival Acuity Means of Arrival Escorted By Service Admission Type    - 6/28/2020 09:07 Urgent Walk-In Self Hospitalist Urgent    Arrival Complaint    Left leg pain        Chief Complaint   Patient presents with    Cellulitis     patient has cellulitis on her right thigh for about a week, was seen at urgent care and given abx  followed up with them today and told to come to the ED for evalution  Assessment/Plan:    36year old female presents to ed from home for evaluation and treatment of cellulitis of her left leg  ON arrival patient reports that symptoms began a week ago with a small pimple to the left thigh which grew in size    Her PCP  prescribed bactrim on 6-24 which was switched to keflex   She was evaluated Sunday in Urgent care and sent to the hospital  For worsening cellulitis  Physical examination shows large area of erythema with central area of induration with tenderness and drainage  US negative for abscess  treated in ed with  9% ns, iv clindamycin and iv vancomycin  Admit to observation for cellulitis of left upper  Inner thigh  Plan to continue iv ancef     ADDENDUM:  Vancomycin discontinued due to rash, inflammation persists  Plan to continue iv ancef and doxycycline  Anticipate  >2mn treatment    Continue to closely monitor cellulitis           ED Triage Vitals   06/28/20 0913 06/28/20 0913 06/28/20 0913 06/28/20 0913 06/28/20 0914   97 5 °F (36 4 °C) 88 16 118/68 97 %      Temporal Monitor         Pain Score       3       06/28/20 65 8 kg (145 lb)     Additional Vital Signs:    Date/Time  Temp  Pulse  Resp  BP  MAP (mmHg)  SpO2  O2 Device   06/29/20 07:35:59  97 8 °F (36 6 °C)  84  18  114/67  83  98 %     06/28/20 23:19:37  99 1 °F (37 3 °C)  94  18      99 %     06/28/20 23:16:01  99 1 °F (37 3 °C)  88  18  113/69  84  97 %     06/28/20 2106              None (Room air)   06/28/20 15:39:34    92  20  114/77  89  97 %     06/28/20 1309              None (Room air)   06/28/20 12:20:21  98 °F (36 7 °C)  78  18  117/76  90  98 %     06/28/20 1200    94    123/74    98 %  None (Room air)   06/28/20 1145    84        100 %     06/28/20 1132    90  18  123/68    100 %     06/28/20 1130    93    123/68  90  99 %                 Pertinent Labs/Diagnostic Test Results:                   US extremity soft tissue   Final  (06/28 1243)      Diffuse soft tissue swelling corresponding to area of clinical concern but no evidence of organized fluid collection to suggest focal abscess            Results from last 7 days   Lab Units 06/29/20  0503 06/28/20  0944   WBC Thousand/uL 4 76 4 51   HEMOGLOBIN g/dL 12 6 13 7   HEMATOCRIT % 38 4 41 1 PLATELETS Thousands/uL 176 172   NEUTROS ABS Thousands/µL 2 38 3 06         Results from last 7 days   Lab Units 06/29/20  0503 06/28/20  0944   SODIUM mmol/L 139 140   POTASSIUM mmol/L 4 1 4 1   CHLORIDE mmol/L 105 104   CO2 mmol/L 31 29   ANION GAP mmol/L 3* 7   BUN mg/dL 9 16   CREATININE mg/dL 0 85 0 78   EGFR ml/min/1 73sq m 86 95   CALCIUM mg/dL 7 9* 9 0   MAGNESIUM mg/dL 1 7  --      Results from last 7 days   Lab Units 06/29/20  0503   AST U/L 33   ALT U/L 43   ALK PHOS U/L 48   TOTAL PROTEIN g/dL 6 4   ALBUMIN g/dL 3 0*   TOTAL BILIRUBIN mg/dL 0 34         Results from last 7 days   Lab Units 06/29/20  0503 06/28/20  0944   GLUCOSE RANDOM mg/dL 99 96       Results from last 7 days   Lab Units 06/28/20  0944   LACTIC ACID mmol/L 0 8       Results from last 7 days   Lab Units 06/28/20  0944   BLOOD CULTURE  Received in Microbiology Lab  Culture in Progress  Received in Microbiology Lab  Culture in Progress  ED Treatment:   Medication Administration from 06/28/2020 0901 to 06/28/2020 1211       Date/Time Order Dose Route Action     06/28/2020 0945 sodium chloride 0 9 % bolus 1,000 mL 1,000 mL Intravenous New Bag     06/28/2020 1042 vancomycin (VANCOCIN) IVPB (premix) 1,000 mg 200 mL 1,000 mg Intravenous New Bag     06/28/2020 1013 clindamycin (CLEOCIN) IVPB (premix) 600 mg 50 mL 600 mg Intravenous New Bag        History reviewed  No pertinent past medical history    Present on Admission:  **None**      Admitting Diagnosis: Cellulitis [L03 90]  Cellulitis of left lower extremity [L03 116]    Age/Sex: 36 y o  female     Admission Orders:  Scheduled Medications:    Medications:  cefazolin 1,000 mg Intravenous Q8H   doxycycline hyclate 100 mg Oral Q12H [address]   heparin (porcine) 5,000 Units Subcutaneous Q8H [address]     Continuous IV Infusions:     PRN Meds:    acetaminophen 650 mg Oral Q6H PRN   HYDROmorphone 0 5 mg Intravenous Q6H PRN   oxyCODONE 5 mg Oral Q6H PRN       IP CONSULT TO PHARMACY    Network Utilization Review Department  [email or web address]  ATTENTION: Please call with any questions or concerns to [phone] and carefully listen to the prompts so that you are directed to the right person  All voicemails are confidential   Zoila Olea all requests for admission clinical reviews, approved or denied determinations and any other requests to dedicated fax number below belonging to the campus where the patient is receiving treatment   List of dedicated fax numbers for the Facilities:  [address] DENIALS (Administrative/Medical Necessity) [phone]   [address] (Maternity/NICU/Pediatrics) [phone]   Brandy Coleman [phone]   Louis Carrion [phone]   [facility] [phone]   Evelin You [phone]   [address] [place] [address] [phone]   [facility]  [phone]   [address] [facility]  [address] [facility] [address] [facility] [phone]

## 2020-06-29 NOTE — PLAN OF CARE
Problem: SKIN/TISSUE INTEGRITY - ADULT  Goal: Skin integrity remains intact  Description  INTERVENTIONS  - Identify patients at risk for skin breakdown  - Assess and monitor skin integrity  - Assess and monitor nutrition and hydration status  - Monitor labs (i e  albumin)  - Assess for incontinence   - Turn and reposition patient  - Assist with mobility/ambulation  - Relieve pressure over bony prominences  - Avoid friction and shearing  - Provide appropriate hygiene as needed including keeping skin clean and dry  - Evaluate need for skin moisturizer/barrier cream  - Collaborate with interdisciplinary team (i e  Nutrition, Rehabilitation, etc )   - Patient/family teaching  Outcome: Progressing  Goal: Incision(s), wounds(s) or drain site(s) healing without S/S of infection  Description  INTERVENTIONS  - Assess and document risk factors for skin impairment   - Assess and document dressing, incision, wound bed, drain sites and surrounding tissue  - Consider nutrition services referral as needed  - Oral mucous membranes remain intact  - Provide patient/ family education  Outcome: Progressing  Goal: Oral mucous membranes remain intact  Description  INTERVENTIONS  - Assess oral mucosa and hygiene practices  - Implement preventative oral hygiene regimen  - Implement oral medicated treatments as ordered  - Initiate Nutrition services referral as needed  Outcome: Progressing     Problem: PAIN - ADULT  Goal: Verbalizes/displays adequate comfort level or baseline comfort level  Description  Interventions:  - Encourage patient to monitor pain and request assistance  - Assess pain using appropriate pain scale  - Administer analgesics based on type and severity of pain and evaluate response  - Implement non-pharmacological measures as appropriate and evaluate response  - Consider cultural and social influences on pain and pain management  - Notify physician/advanced practitioner if interventions unsuccessful or patient reports new pain  Outcome: Progressing     Problem: INFECTION - ADULT  Goal: Absence or prevention of progression during hospitalization  Description  INTERVENTIONS:  - Assess and monitor for signs and symptoms of infection  - Monitor lab/diagnostic results  - Monitor all insertion sites, i e  indwelling lines, tubes, and drains  - Monitor endotracheal if appropriate and nasal secretions for changes in amount and color  - [place] appropriate cooling/warming therapies per order  - Administer medications as ordered  - Instruct and encourage patient and family to use good hand hygiene technique  - Identify and instruct in appropriate isolation precautions for identified infection/condition  Outcome: Progressing  Goal: Absence of fever/infection during neutropenic period  Description  INTERVENTIONS:  - Monitor WBC    Outcome: Progressing     Problem: SAFETY ADULT  Goal: Patient will remain free of falls  Description  INTERVENTIONS:  - Assess patient frequently for physical needs  -  Identify cognitive and physical deficits and behaviors that affect risk of falls    -  [place] fall precautions as indicated by assessment   - Educate patient/family on patient safety including physical limitations  - Instruct patient to call for assistance with activity based on assessment  - Modify environment to reduce risk of injury  - Consider OT/PT consult to assist with strengthening/mobility  Outcome: Progressing  Goal: Maintain or return to baseline ADL function  Description  INTERVENTIONS:  -  Assess patient's ability to carry out ADLs; assess patient's baseline for ADL function and identify physical deficits which impact ability to perform ADLs (bathing, care of mouth/teeth, toileting, grooming, dressing, etc )  - Assess/evaluate cause of self-care deficits   - Assess range of motion  - Assess patient's mobility; develop plan if impaired  - Assess patient's need for assistive devices and provide as appropriate  - Encourage maximum independence but intervene and supervise when necessary  - Involve family in performance of ADLs  - Assess for home care needs following discharge   - Consider OT consult to assist with ADL evaluation and planning for discharge  - Provide patient education as appropriate  Outcome: Progressing  Goal: Maintain or return mobility status to optimal level  Description  INTERVENTIONS:  - Assess patient's baseline mobility status (ambulation, transfers, stairs, etc )    - Identify cognitive and physical deficits and behaviors that affect mobility  - Identify mobility aids required to assist with transfers and/or ambulation (gait belt, sit-to-stand, lift, walker, cane, etc )  - [place] fall precautions as indicated by assessment  - Record patient progress and toleration of activity level on Mobility SBAR; progress patient to next Phase/Stage  - Instruct patient to call for assistance with activity based on assessment  - Consider rehabilitation consult to assist with strengthening/weightbearing, etc   Outcome: Progressing

## 2020-06-30 ENCOUNTER — APPOINTMENT (INPATIENT)
Dept: CT IMAGING | Facility: HOSPITAL | Age: 40
DRG: 603 | End: 2020-06-30
Payer: COMMERCIAL

## 2020-06-30 VITALS
HEART RATE: 75 BPM | OXYGEN SATURATION: 96 % | TEMPERATURE: 97.6 F | HEIGHT: 67 IN | RESPIRATION RATE: 20 BRPM | DIASTOLIC BLOOD PRESSURE: 72 MMHG | BODY MASS INDEX: 22.76 KG/M2 | SYSTOLIC BLOOD PRESSURE: 119 MMHG | WEIGHT: 145 LBS

## 2020-06-30 LAB
ANION GAP SERPL CALCULATED.3IONS-SCNC: 4 MMOL/L (ref 4–13)
BASOPHILS # BLD AUTO: 0.05 THOUSANDS/ΜL (ref 0–0.1)
BASOPHILS NFR BLD AUTO: 1 % (ref 0–1)
BUN SERPL-MCNC: 13 MG/DL (ref 5–25)
CALCIUM SERPL-MCNC: 8.7 MG/DL (ref 8.3–10.1)
CHLORIDE SERPL-SCNC: 105 MMOL/L (ref 100–108)
CO2 SERPL-SCNC: 32 MMOL/L (ref 21–32)
CREAT SERPL-MCNC: 0.78 MG/DL (ref 0.6–1.3)
EOSINOPHIL # BLD AUTO: 0.17 THOUSAND/ΜL (ref 0–0.61)
EOSINOPHIL NFR BLD AUTO: 4 % (ref 0–6)
ERYTHROCYTE [DISTWIDTH] IN BLOOD BY AUTOMATED COUNT: 12.1 % (ref 11.6–15.1)
GFR SERPL CREATININE-BSD FRML MDRD: 95 ML/MIN/1.73SQ M
GLUCOSE SERPL-MCNC: 95 MG/DL (ref 65–140)
HCT VFR BLD AUTO: 41.3 % (ref 34.8–46.1)
HGB BLD-MCNC: 13.7 G/DL (ref 11.5–15.4)
IMM GRANULOCYTES # BLD AUTO: 0.05 THOUSAND/UL (ref 0–0.2)
IMM GRANULOCYTES NFR BLD AUTO: 1 % (ref 0–2)
LYMPHOCYTES # BLD AUTO: 1.69 THOUSANDS/ΜL (ref 0.6–4.47)
LYMPHOCYTES NFR BLD AUTO: 44 % (ref 14–44)
MCH RBC QN AUTO: 29.5 PG (ref 26.8–34.3)
MCHC RBC AUTO-ENTMCNC: 33.2 G/DL (ref 31.4–37.4)
MCV RBC AUTO: 89 FL (ref 82–98)
MONOCYTES # BLD AUTO: 0.71 THOUSAND/ΜL (ref 0.17–1.22)
MONOCYTES NFR BLD AUTO: 18 % (ref 4–12)
NEUTROPHILS # BLD AUTO: 1.24 THOUSANDS/ΜL (ref 1.85–7.62)
NEUTS SEG NFR BLD AUTO: 32 % (ref 43–75)
NRBC BLD AUTO-RTO: 0 /100 WBCS
PLATELET # BLD AUTO: 188 THOUSANDS/UL (ref 149–390)
PMV BLD AUTO: 9.5 FL (ref 8.9–12.7)
POTASSIUM SERPL-SCNC: 4.5 MMOL/L (ref 3.5–5.3)
RBC # BLD AUTO: 4.64 MILLION/UL (ref 3.81–5.12)
SODIUM SERPL-SCNC: 141 MMOL/L (ref 136–145)
WBC # BLD AUTO: 3.91 THOUSAND/UL (ref 4.31–10.16)

## 2020-06-30 PROCEDURE — 99217 PR OBSERVATION CARE DISCHARGE MANAGEMENT: CPT | Performed by: INTERNAL MEDICINE

## 2020-06-30 PROCEDURE — 80048 BASIC METABOLIC PNL TOTAL CA: CPT | Performed by: INTERNAL MEDICINE

## 2020-06-30 PROCEDURE — 85025 COMPLETE CBC W/AUTO DIFF WBC: CPT | Performed by: INTERNAL MEDICINE

## 2020-06-30 PROCEDURE — 72193 CT PELVIS W/DYE: CPT

## 2020-06-30 RX ORDER — DOXYCYCLINE HYCLATE 100 MG/1
100 CAPSULE ORAL EVERY 12 HOURS SCHEDULED
Qty: 14 CAPSULE | Refills: 0 | Status: SHIPPED | OUTPATIENT
Start: 2020-06-30 | End: 2020-06-30

## 2020-06-30 RX ORDER — DOXYCYCLINE HYCLATE 100 MG/1
100 CAPSULE ORAL EVERY 12 HOURS SCHEDULED
Qty: 14 CAPSULE | Refills: 0 | Status: SHIPPED | OUTPATIENT
Start: 2020-06-30 | End: 2020-07-07

## 2020-06-30 RX ORDER — CEPHALEXIN 500 MG/1
500 CAPSULE ORAL EVERY 6 HOURS SCHEDULED
Qty: 12 CAPSULE | Refills: 0 | Status: SHIPPED | OUTPATIENT
Start: 2020-06-30 | End: 2020-07-03

## 2020-06-30 RX ORDER — CEPHALEXIN 500 MG/1
500 CAPSULE ORAL EVERY 6 HOURS SCHEDULED
Qty: 12 CAPSULE | Refills: 0 | Status: SHIPPED | OUTPATIENT
Start: 2020-06-30 | End: 2020-06-30 | Stop reason: SDUPTHER

## 2020-06-30 RX ORDER — OXYCODONE HYDROCHLORIDE 5 MG/1
5 TABLET ORAL EVERY 6 HOURS PRN
Qty: 6 TABLET | Refills: 0 | Status: SHIPPED | OUTPATIENT
Start: 2020-06-30 | End: 2020-06-30 | Stop reason: HOSPADM

## 2020-06-30 RX ADMIN — DOXYCYCLINE 100 MG: 100 CAPSULE ORAL at 08:40

## 2020-06-30 RX ADMIN — IBUPROFEN 600 MG: 600 TABLET ORAL at 14:29

## 2020-06-30 RX ADMIN — IOHEXOL 85 ML: 350 INJECTION, SOLUTION INTRAVENOUS at 15:46

## 2020-06-30 RX ADMIN — CEFAZOLIN SODIUM 1000 MG: 1 SOLUTION INTRAVENOUS at 14:30

## 2020-06-30 RX ADMIN — CEFAZOLIN SODIUM 1000 MG: 1 SOLUTION INTRAVENOUS at 05:47

## 2020-06-30 RX ADMIN — IBUPROFEN 600 MG: 600 TABLET ORAL at 05:53

## 2020-06-30 NOTE — ASSESSMENT & PLAN NOTE
Presents with left perineal region cellulitis  Ultrasound done and showed no evidence of abscess collection  Patient had rash for vancomycin and discontinued  Cellulitis has significantly improved however pain still persists  CT done and showed no evidence of abscess  Patient will be discharged with Keflex and doxycycline for 7 more days to finish a 10 days course of antibiotics  Patient advised to follow-up with her primary care physician within 3-5 days post discharge  Patient advised to return to ED for worsening pain, fever, chills

## 2020-06-30 NOTE — PLAN OF CARE
Problem: SKIN/TISSUE INTEGRITY - ADULT  Goal: Skin integrity remains intact  Description  INTERVENTIONS  - Identify patients at risk for skin breakdown  - Assess and monitor skin integrity  - Assess and monitor nutrition and hydration status  - Monitor labs (i e  albumin)  - Assess for incontinence   - Turn and reposition patient  - Assist with mobility/ambulation  - Relieve pressure over bony prominences  - Avoid friction and shearing  - Provide appropriate hygiene as needed including keeping skin clean and dry  - Evaluate need for skin moisturizer/barrier cream  - Collaborate with interdisciplinary team (i e  Nutrition, Rehabilitation, etc )   - Patient/family teaching  Outcome: Progressing  Goal: Incision(s), wounds(s) or drain site(s) healing without S/S of infection  Description  INTERVENTIONS  - Assess and document risk factors for skin impairment   - Assess and document dressing, incision, wound bed, drain sites and surrounding tissue  - Consider nutrition services referral as needed  - Oral mucous membranes remain intact  - Provide patient/ family education  Outcome: Progressing  Goal: Oral mucous membranes remain intact  Description  INTERVENTIONS  - Assess oral mucosa and hygiene practices  - Implement preventative oral hygiene regimen  - Implement oral medicated treatments as ordered  - Initiate Nutrition services referral as needed  Outcome: Progressing     Problem: PAIN - ADULT  Goal: Verbalizes/displays adequate comfort level or baseline comfort level  Description  Interventions:  - Encourage patient to monitor pain and request assistance  - Assess pain using appropriate pain scale  - Administer analgesics based on type and severity of pain and evaluate response  - Implement non-pharmacological measures as appropriate and evaluate response  - Consider cultural and social influences on pain and pain management  - Notify physician/advanced practitioner if interventions unsuccessful or patient reports new pain  Outcome: Progressing     Problem: INFECTION - ADULT  Goal: Absence or prevention of progression during hospitalization  Description  INTERVENTIONS:  - Assess and monitor for signs and symptoms of infection  - Monitor lab/diagnostic results  - Monitor all insertion sites, i e  indwelling lines, tubes, and drains  - Monitor endotracheal if appropriate and nasal secretions for changes in amount and color  - [place] appropriate cooling/warming therapies per order  - Administer medications as ordered  - Instruct and encourage patient and family to use good hand hygiene technique  - Identify and instruct in appropriate isolation precautions for identified infection/condition  Outcome: Progressing  Goal: Absence of fever/infection during neutropenic period  Description  INTERVENTIONS:  - Monitor WBC    Outcome: Progressing     Problem: SAFETY ADULT  Goal: Patient will remain free of falls  Description  INTERVENTIONS:  - Assess patient frequently for physical needs  -  Identify cognitive and physical deficits and behaviors that affect risk of falls    -  [place] fall precautions as indicated by assessment   - Educate patient/family on patient safety including physical limitations  - Instruct patient to call for assistance with activity based on assessment  - Modify environment to reduce risk of injury  - Consider OT/PT consult to assist with strengthening/mobility  Outcome: Progressing  Goal: Maintain or return to baseline ADL function  Description  INTERVENTIONS:  -  Assess patient's ability to carry out ADLs; assess patient's baseline for ADL function and identify physical deficits which impact ability to perform ADLs (bathing, care of mouth/teeth, toileting, grooming, dressing, etc )  - Assess/evaluate cause of self-care deficits   - Assess range of motion  - Assess patient's mobility; develop plan if impaired  - Assess patient's need for assistive devices and provide as appropriate  - Encourage maximum independence but intervene and supervise when necessary  - Involve family in performance of ADLs  - Assess for home care needs following discharge   - Consider OT consult to assist with ADL evaluation and planning for discharge  - Provide patient education as appropriate  Outcome: Progressing  Goal: Maintain or return mobility status to optimal level  Description  INTERVENTIONS:  - Assess patient's baseline mobility status (ambulation, transfers, stairs, etc )    - Identify cognitive and physical deficits and behaviors that affect mobility  - Identify mobility aids required to assist with transfers and/or ambulation (gait belt, sit-to-stand, lift, walker, cane, etc )  - [place] fall precautions as indicated by assessment  - Record patient progress and toleration of activity level on Mobility SBAR; progress patient to next Phase/Stage  - Instruct patient to call for assistance with activity based on assessment  - Consider rehabilitation consult to assist with strengthening/weightbearing, etc   Outcome: Progressing

## 2020-06-30 NOTE — DISCHARGE SUMMARY
Discharge- Neri Castro 1980, 36 y o  female MRN: [id number]    Unit/Bed#: -01 Encounter: [id number]    Primary Care Provider: Whit Leggett MD   Date and time admitted to hospital: 6/28/2020  9:08 AM    * Cellulitis  Assessment & Plan  Presents with left perineal region cellulitis  Ultrasound done and showed no evidence of abscess collection on admission  Patient had rash for vancomycin and discontinued  Cellulitis has significantly improved however pain still persists for which CT done and showed no evidence of abscess  Patient will be discharged with Keflex and doxycycline for 7 more days to finish a 10 days course of antibiotics  Patient advised to follow-up with her primary care physician within 3-5 days post discharge  Patient advised to return to ED for worsening pain, fever, chills    Discharging Physician / Practitioner: Harshal Mina MD  PCP: Whit Leggett MD  Admission Date:   Admission Orders (From admission, onward)     Ordered        06/29/20 1032  Inpatient Admission  Once         06/28/20 1125  Place in Observation (expected length of stay for this patient is less than two midnights)  Once                   Discharge Date: 06/30/20    Disposition:      Other: Home    For Discharges to [facility]:   · Not Applicable to this Patient - Not Applicable to this Patient    Reason for Admission:  Cellulitis    Discharge Diagnoses:     Please see assessment and plan section above for further details regarding discharge diagnoses  Resolved Problems  Date Reviewed: 6/28/2020    None          Consultations During Hospital Stay:  [facility]     Procedures Performed:   * No surgery found *      Ct Pelvis W Contrast    Result Date: 6/30/2020  Narrative: CT PELVIS WITH IV CONTRAST INDICATION:   cellulitis to rule out abscess  COMPARISON:  None  TECHNIQUE: CT examination of the pelvis was performed    Axial, sagittal, and coronal 2D reformatted images were created from the source data and submitted for interpretation  Radiation dose length product (DLP) for this visit:  381 mGy-cm   This examination, like all CT scans performed in the [facility], was performed utilizing techniques to minimize radiation dose exposure, including the use of iterative reconstruction and automated exposure control  IV Contrast:  85 mL of iohexol (OMNIPAQUE) Enteric Contrast:  Enteric contrast was administered  FINDINGS: VISUALIZED KIDNEYS/URETERS:  No significant abnormality identified in the partially imaged kidneys and ureters  REPRODUCTIVE ORGANS:  Uterus is retroverted  URINARY BLADDER:  Unremarkable  APPENDIX:  No findings to suggest appendicitis  VISUALIZED BOWEL:  Unremarkable  ABDOMINOPELVIC CAVITY:  No ascites or free intraperitoneal air  No lymphadenopathy  VISUALIZED VESSELS:  Unremarkable for patient's age  ABDOMINOPELVIC WALL/INGUINAL REGIONS: Left inferior gluteal wall demonstrates thickening of the skin with subcutaneous infiltrative changes  No fluid collection or abscess formation seen  OSSEOUS STRUCTURES:  No acute fracture or destructive osseous lesion  Impression: Left inferior gluteal infiltrative changes with skin thickening correlates with history of cellulitis  No abscess  Workstation performed: [id number]     Us Extremity Soft Tissue    Result Date: 6/28/2020  Narrative: ULTRASOUND INDICATION:   History of left proximal medial thigh and buttock pain, concern for cellulitis  Evaluate for soft tissue abscess  COMPARISON:  None TECHNIQUE:   Real-time ultrasound of the area of concern was performed with a linear transducer with both volumetric sweeps and still imaging techniques  FINDINGS:  Examination demonstrates diffuse soft tissue swelling in the area of clinical concern  However, there is no evidence of an organized abscess  No focal mass is appreciated          Impression: Diffuse soft tissue swelling corresponding to area of clinical concern but no evidence of organized fluid collection to suggest focal abscess Workstation performed: [id number]        Medication Adjustments and Discharge Medications:  · Summary of Medication Adjustments made as a result of this hospitalization:  Keflex and doxycycline  · Medication Dosing Tapers - Please refer to Discharge Medication List for details on any medication dosing tapers (if applicable to patient)  · Discharge Medication List: See after visit summary for reconciled discharge medications  Wound Care Recommendations:  When applicable, please see wound care section of After Visit Summary  Diet Recommendations at Discharge:  Diet -        Diet Orders   (From admission, onward)             Start     Ordered    06/28/20 1244  Diet Regular; Regular House  (ED Bridging Orders Panel)  Diet effective now     Question Answer Comment   Diet Type Regular    Regular Regular House        06/28/20 1244                  Incidental Findings:   · None     Test Results Pending at Discharge (will require follow up): · None         Hospital Course:     Urszula Catalan is a 36 y o  female patient who originally presented to the hospital on 6/28/2020 due to cellulitis over the perineal region  Significantly improved with IV antibiotics (Ancef and doxycycline) initially was started on vancomycin and showed significant rash and stop  Patient showed significant improvement ultrasound and CT scan to rule out abscess done and showed no evidence of abscess  Patient advised to follow-up with her primary care physician within 3-5 days post discharge  Keflex and doxycycline for 7 more days sent to her pharmacy      Condition at Discharge: stable     Discharge Day Visit / Exam:     Subjective:  No complaints  Vitals: Blood Pressure: 119/72 (06/30/20 1517)  Pulse: 75 (06/30/20 1517)  Temperature: 97 6 °F (36 4 °C) (06/30/20 1517)  Temp Source: Temporal (06/28/20 1220)  Respirations: 20 (06/30/20 1517)  Height: 5' 7" (170 2 cm) (06/28/20 1220)  Weight - Scale: 65 8 kg (145 lb) (06/28/20 0914)  SpO2: 96 % (06/30/20 1517)  Exam:     General appearance: alert, appears stated age and cooperative  Head: Normocephalic, without obvious abnormality, atraumatic  Lungs: clear to auscultation bilaterally  Heart: regular rate and rhythm  Abdomen: soft, non-tender, positive bowel sounds   Back: negative  Extremities: Cellulitis over the left groin region has significantly improved  Neurologic: Grossly normal      Discharge instructions/Information to patient and family:   See after visit summary section titled Discharge Instructions for information provided to patient and family  Planned Readmission:  No      Discharge Statement:  I spent 35 minutes discharging the patient  This time was spent on the day of discharge  I had direct contact with the patient on the day of discharge  Greater than 50% of the total time was spent examining patient, answering all patient questions, arranging and discussing plan of care with patient as well as directly providing post-discharge instructions  Additional time then spent on discharge activities      ** Please Note: This note has been constructed using a voice recognition system **

## 2020-06-30 NOTE — NURSING NOTE
Peripheral IV removed  Belongings sent home with pt  AVS printed and reviewed with pt  All questions answered  Scripts sent electronically to pharmacy

## 2020-06-30 NOTE — SOCIAL WORK
Current LOS 1 day  OBS status  CM met with patient at the bedside,baseline information was obtained  CM discussed the role of CM in helping the patient develop a discharge plan and assist the patient in carry out their plan  Pt lives with her  and children (16, 6 & 6) in a 2 SH, 3 SREE with first floor setup  Pt completes ADLs and IADLs independently  Pt ambulates independently  No DME  Pt works  Pt drives  Pt has no hx of STR or HHC  Pt denies hx of MH or D&A tx  PCP: Dr Otilia Joseph [facility])   Preferred Pharmacy: [facility] on [address] [place] [address]    Contact: Monico Tavera () [phone] or Emigdio Andrew (mother) [phone]  No formal POA/LW  Pt states she would want her mother to speak on her behalf  CM explained [place] Act 169  CM will provide pt with a 5 wishes packet  Pt has her car on site and if medically appropriate will drive herself home at d/c  Otherwise pt does have family/friends to transport her home at time of d/c,     CM reviewed OBS notice with pt  (none on chart)  Pt verbalized understanding and signed form for chart   CM provided pt with a copy of form

## 2020-07-03 LAB
BACTERIA BLD CULT: NORMAL
BACTERIA BLD CULT: NORMAL

## 2020-10-26 ENCOUNTER — DOCTOR'S OFFICE (OUTPATIENT)
Dept: URBAN - NONMETROPOLITAN AREA CLINIC 1 | Facility: CLINIC | Age: 40
Setting detail: OPHTHALMOLOGY
End: 2020-10-26
Payer: COMMERCIAL

## 2020-10-26 ENCOUNTER — RX ONLY (RX ONLY)
Age: 40
End: 2020-10-26

## 2020-10-26 DIAGNOSIS — A69.20: ICD-10-CM

## 2020-10-26 PROBLEM — H52.4 PRESBYOPIA: Status: ACTIVE | Noted: 2020-10-26

## 2020-10-26 PROCEDURE — 99204 OFFICE O/P NEW MOD 45 MIN: CPT | Performed by: OPHTHALMOLOGY

## 2020-10-26 ASSESSMENT — KERATOMETRY
OD_K1POWER_DIOPTERS: 42.50
OD_K2POWER_DIOPTERS: 43.25
OS_AXISANGLE_DEGREES: 003
OS_K2POWER_DIOPTERS: 43.50
OS_K1POWER_DIOPTERS: 42.50
OD_AXISANGLE_DEGREES: 169

## 2020-10-26 ASSESSMENT — REFRACTION_AUTOREFRACTION
OD_AXIS: 099
OD_CYLINDER: -0.50
OS_SPHERE: -0.25
OD_SPHERE: -0.25

## 2020-10-26 ASSESSMENT — LID EXAM ASSESSMENTS
OS_COMMENTS: TRACE MGD
OD_COMMENTS: TRACE MGD

## 2020-10-26 ASSESSMENT — VISUAL ACUITY
OS_BCVA: 20/25
OD_BCVA: 20/25

## 2020-10-26 ASSESSMENT — CONFRONTATIONAL VISUAL FIELD TEST (CVF)
OS_FINDINGS: FULL
OD_FINDINGS: FULL

## 2020-10-26 ASSESSMENT — SPHEQUIV_DERIVED: OD_SPHEQUIV: -0.5

## 2020-10-26 ASSESSMENT — AXIALLENGTH_DERIVED: OD_AL: 24.024

## 2021-07-26 ENCOUNTER — HOSPITAL ENCOUNTER (EMERGENCY)
Facility: HOSPITAL | Age: 41
Discharge: HOME | End: 2021-07-26
Attending: EMERGENCY MEDICINE
Payer: COMMERCIAL

## 2021-07-26 ENCOUNTER — APPOINTMENT (EMERGENCY)
Dept: RADIOLOGY | Facility: HOSPITAL | Age: 41
End: 2021-07-26
Payer: COMMERCIAL

## 2021-07-26 VITALS
HEART RATE: 72 BPM | SYSTOLIC BLOOD PRESSURE: 119 MMHG | OXYGEN SATURATION: 97 % | HEIGHT: 67 IN | TEMPERATURE: 97.7 F | BODY MASS INDEX: 25.19 KG/M2 | DIASTOLIC BLOOD PRESSURE: 72 MMHG | WEIGHT: 160.5 LBS | RESPIRATION RATE: 17 BRPM

## 2021-07-26 DIAGNOSIS — S91.114A LACERATION OF LESSER TOE OF RIGHT FOOT WITHOUT FOREIGN BODY PRESENT OR DAMAGE TO NAIL, INITIAL ENCOUNTER: Primary | ICD-10-CM

## 2021-07-26 PROCEDURE — 99284 EMERGENCY DEPT VISIT MOD MDM: CPT | Performed by: PHYSICIAN ASSISTANT

## 2021-07-26 PROCEDURE — 73660 X-RAY EXAM OF TOE(S): CPT

## 2021-07-26 PROCEDURE — 90715 TDAP VACCINE 7 YRS/> IM: CPT | Performed by: PHYSICIAN ASSISTANT

## 2021-07-26 PROCEDURE — 12001 RPR S/N/AX/GEN/TRNK 2.5CM/<: CPT | Performed by: PHYSICIAN ASSISTANT

## 2021-07-26 PROCEDURE — 99283 EMERGENCY DEPT VISIT LOW MDM: CPT

## 2021-07-26 PROCEDURE — 90471 IMMUNIZATION ADMIN: CPT

## 2021-07-26 RX ORDER — BACITRACIN, NEOMYCIN, POLYMYXIN B 400; 3.5; 5 [USP'U]/G; MG/G; [USP'U]/G
1 OINTMENT TOPICAL ONCE
Status: COMPLETED | OUTPATIENT
Start: 2021-07-26 | End: 2021-07-26

## 2021-07-26 RX ORDER — FEXOFENADINE HCL 180 MG/1
180 TABLET ORAL DAILY
COMMUNITY

## 2021-07-26 RX ORDER — CEFADROXIL 500 MG/1
500 CAPSULE ORAL EVERY 12 HOURS SCHEDULED
Qty: 6 CAPSULE | Refills: 0 | Status: SHIPPED | OUTPATIENT
Start: 2021-07-26 | End: 2021-07-29

## 2021-07-26 RX ORDER — LIDOCAINE HYDROCHLORIDE 10 MG/ML
10 INJECTION, SOLUTION EPIDURAL; INFILTRATION; INTRACAUDAL; PERINEURAL ONCE
Status: DISCONTINUED | OUTPATIENT
Start: 2021-07-26 | End: 2021-07-26 | Stop reason: HOSPADM

## 2021-07-26 RX ORDER — MONTELUKAST SODIUM 10 MG/1
10 TABLET ORAL
COMMUNITY

## 2021-07-26 RX ADMIN — BACITRACIN, NEOMYCIN, POLYMYXIN B 1 SMALL APPLICATION: 400; 3.5; 5 OINTMENT TOPICAL at 16:58

## 2021-07-26 RX ADMIN — TETANUS TOXOID, REDUCED DIPHTHERIA TOXOID AND ACELLULAR PERTUSSIS VACCINE, ADSORBED 0.5 ML: 5; 2.5; 8; 8; 2.5 SUSPENSION INTRAMUSCULAR at 16:58

## 2021-07-26 NOTE — Clinical Note
Larry Rex was seen and treated in our emergency department on 7/26/2021  Diagnosis:     Franny Crimes    She may return on this date: 07/28/2021         If you have any questions or concerns, please don't hesitate to call        Lucrecia Moreno PA-C    ______________________________           _______________          _______________  Hospital Representative                              Date                                Time

## 2021-07-26 NOTE — Clinical Note
Yamile Cruz was seen and treated in our emergency department on 7/26/2021  Diagnosis:     Emeka Michaelgenaro    She may return on this date: 07/29/2021         If you have any questions or concerns, please don't hesitate to call        Emelina Barrientos PA-C    ______________________________           _______________          _______________  Hospital Representative                              Date                                Time

## 2021-07-26 NOTE — ED PROVIDER NOTES
History  Chief Complaint   Patient presents with    Toe Laceration     pt hit right foot on heater lacerating pinky toe 30min ago  39year old female presents to the emergency department for evaluation injury small toe laceration  Patient states she stubbed the toe on a heater approximately 30 minutes prior to arrival   Unknown last tetanus shot  She reports pain in the small toe  No other pain  Denies previous injury       History provided by:  Patient  Laceration  Location:  Toe  Toe laceration location:  L little toe  Length:  1  Depth:  Cutaneous  Time since incident:  30 minutes  Laceration mechanism:  Metal edge  Pain details:     Quality:  Aching  Relieved by:  Nothing  Worsened by: Movement  Ineffective treatments:  None tried  Tetanus status:  Unknown  Associated symptoms: no fever, no focal weakness, no numbness, no rash, no redness, no swelling and no streaking        Prior to Admission Medications   Prescriptions Last Dose Informant Patient Reported? Taking?   fexofenadine (ALLEGRA) 180 MG tablet 7/26/2021 at Unknown time Self Yes Yes   Sig: Take 180 mg by mouth daily   montelukast (SINGULAIR) 10 mg tablet 7/26/2021 at Unknown time Self Yes Yes   Sig: Take 10 mg by mouth daily at bedtime      Facility-Administered Medications: None       History reviewed  No pertinent past medical history  Past Surgical History:   Procedure Laterality Date    TUBAL LIGATION         History reviewed  No pertinent family history  I have reviewed and agree with the history as documented  E-Cigarette/Vaping    E-Cigarette Use Never User      E-Cigarette/Vaping Substances     Social History     Tobacco Use    Smoking status: Never Smoker    Smokeless tobacco: Never Used   Vaping Use    Vaping Use: Never used   Substance Use Topics    Alcohol use: Never    Drug use: Never       Review of Systems   Constitutional: Negative  Negative for fever  HENT: Negative  Respiratory: Negative  Cardiovascular: Negative  Gastrointestinal: Negative  Musculoskeletal: Negative  Skin: Positive for wound  Negative for rash  Neurological: Negative  Negative for focal weakness  All other systems reviewed and are negative  Physical Exam  Physical Exam  Vitals and nursing note reviewed  Constitutional:       General: She is not in acute distress  Appearance: Normal appearance  She is normal weight  She is not ill-appearing, toxic-appearing or diaphoretic  HENT:      Head: Normocephalic and atraumatic  Nose: Nose normal    Eyes:      Conjunctiva/sclera: Conjunctivae normal    Cardiovascular:      Pulses:           Posterior tibial pulses are 2+ on the right side  Pulmonary:      Effort: Pulmonary effort is normal    Musculoskeletal:         General: Tenderness present  No swelling  Cervical back: Normal range of motion  Comments: Tenderness the right small toe  No ankle or foot tenderness  Skin:     General: Skin is warm and dry  Capillary Refill: Capillary refill takes less than 2 seconds  Coloration: Skin is not jaundiced or pale  Findings: No bruising or erythema  Comments: 1 cm laceration rt small toe  No active bleeding  No foreign bodies  No nail involvement   Neurological:      General: No focal deficit present  Mental Status: She is alert and oriented to person, place, and time     Psychiatric:         Mood and Affect: Mood normal          Behavior: Behavior normal          Vital Signs  ED Triage Vitals [07/26/21 1559]   Temperature Pulse Respirations Blood Pressure SpO2   97 7 °F (36 5 °C) 72 17 119/72 97 %      Temp Source Heart Rate Source Patient Position - Orthostatic VS BP Location FiO2 (%)   Temporal Monitor Sitting Right arm --      Pain Score       --           Vitals:    07/26/21 1559   BP: 119/72   Pulse: 72   Patient Position - Orthostatic VS: Sitting         Visual Acuity      ED Medications  Medications   lidocaine (PF) (XYLOCAINE-MPF) 1 % injection 10 mL (has no administration in time range)   tetanus-diphtheria-acellular pertussis (BOOSTRIX) IM injection 0 5 mL (0 5 mL Intramuscular Given 7/26/21 1658)   neomycin-bacitracin-polymyxin b (NEOSPORIN) ointment 1 small application (1 small application Topical Given 7/26/21 1658)       Diagnostic Studies  Results Reviewed     None                 XR toe fifth min 2 views RIGHT   ED Interpretation by JUAN RIOS PA-C (07/26 0516)   No acute osseous injury                 Procedures  Laceration repair    Date/Time: 7/26/2021 4:29 PM  Performed by: [facility], PA-C  Authorized by: [facility], PA-C   Risks and benefits: risks, benefits and alternatives were discussed  Consent given by: patient  Patient understanding: patient states understanding of the procedure being performed  Patient consent: the patient's understanding of the procedure matches consent given  Radiology Images displayed and confirmed  If images not available, report reviewed: imaging studies available  Patient identity confirmed: verbally with patient and arm band  Time out: Immediately prior to procedure a "time out" was called to verify the correct patient, procedure, equipment, support staff and site/side marked as required  Body area: lower extremity  Location details: right little toe  Laceration length: 1 cm  Tendon involvement: none  Nerve involvement: none  Vascular damage: no  Anesthesia: local infiltration    Anesthesia:  Local Anesthetic: lidocaine 1% without epinephrine    Sedation:  Patient sedated: no      Wound Dehiscence:  Superficial Wound Dehiscence: simple closure      Procedure Details:  Preparation: Patient was prepped and draped in the usual sterile fashion    Irrigation solution: saline  Irrigation method: syringe  Amount of cleaning: standard  Debridement: none  Degree of undermining: none  Skin closure: 4-0 nylon  Number of sutures: 2  Technique: simple  Approximation: close  Approximation difficulty: simple  Dressing: antibiotic ointment  Patient tolerance: patient tolerated the procedure well with no immediate complications               ED Course  ED Course as of Jul 26 1732   Mon Jul 26, 2021   1627 2 stiches placed        1634 No acute osseous injury noted on ED interpretation of x-ray      1634 I discussed all results and findings with the patient  We discussed symptomatic care at and symptoms that require prompt return to the ED for further evaluation and patient verbalized understanding  MDM  Number of Diagnoses or Management Options  Laceration of lesser toe of right foot without foreign body present or damage to nail, initial encounter: new and requires workup  Diagnosis management comments: 39year old female presents emergency department status post stubbing the right small toe off heater  1 cm laceration on the base of the right 5th toe  Tenderness to palpation  Tetanus was updated  ED interpretation of x-ray negative for acute osseous injury  Two stitches were placed  Patient was educated on symptomatic treatment, symptoms that require prompt return to the ED for further evaluation verbalized understanding  Return procedures were discussed and patient verbalized understanding         Amount and/or Complexity of Data Reviewed  Tests in the radiology section of CPT®: ordered and reviewed  Review and summarize past medical records: yes  Independent visualization of images, tracings, or specimens: yes        Disposition  Final diagnoses:   Laceration of lesser toe of right foot without foreign body present or damage to nail, initial encounter     Time reflects when diagnosis was documented in both MDM as applicable and the Disposition within this note     Time User Action Codes Description Comment    7/26/2021  4:31 PM Larry Hearn Add [O61 114A] Laceration of lesser toe of right foot without foreign body present or damage to nail, initial encounter       ED Disposition     ED Disposition Condition Date/Time Comment    Discharge Stable Mon Jul 26, 2021  4:31 PM Hansa Mercer discharge to home/self care  Follow-up Information     Follow up With Specialties Details Why Contact Info    Momo Posada MD Family Medicine In 1 week For suture removal, For wound re-check [address]  [phone]            Discharge Medication List as of 7/26/2021  4:33 PM      START taking these medications    Details   cefadroxil (DURICEF) 500 mg capsule Take 1 capsule (500 mg total) by mouth every 12 (twelve) hours for 3 days, Starting Mon 7/26/2021, Until u 7/29/2021, Normal         CONTINUE these medications which have NOT CHANGED    Details   fexofenadine (ALLEGRA) 180 MG tablet Take 180 mg by mouth daily, Historical Med      montelukast (SINGULAIR) 10 mg tablet Take 10 mg by mouth daily at bedtime, Historical Med           No discharge procedures on file      PDMP Review     None          ED Provider  Electronically Signed by           Saintclair Bride, PA-C  07/26/21 7844

## 2021-07-26 NOTE — DISCHARGE INSTRUCTIONS
Please keep clean and dry as discussed  If you have any signs of infection please return immediately to the emergency department  Please take antibiotics as prescribed    Your wound should be re-evaluated for suture removal in 7-10 days

## 2022-07-06 ENCOUNTER — HOSPITAL ENCOUNTER (EMERGENCY)
Facility: HOSPITAL | Age: 42
Discharge: HOME | End: 2022-07-06
Attending: EMERGENCY MEDICINE | Admitting: EMERGENCY MEDICINE
Payer: COMMERCIAL

## 2022-07-06 ENCOUNTER — APPOINTMENT (EMERGENCY)
Dept: CT IMAGING | Facility: HOSPITAL | Age: 42
End: 2022-07-06
Payer: COMMERCIAL

## 2022-07-06 ENCOUNTER — APPOINTMENT (EMERGENCY)
Dept: MRI IMAGING | Facility: HOSPITAL | Age: 42
End: 2022-07-06
Payer: COMMERCIAL

## 2022-07-06 VITALS
HEART RATE: 58 BPM | TEMPERATURE: 97.8 F | WEIGHT: 135 LBS | BODY MASS INDEX: 21.14 KG/M2 | DIASTOLIC BLOOD PRESSURE: 67 MMHG | OXYGEN SATURATION: 100 % | RESPIRATION RATE: 19 BRPM | SYSTOLIC BLOOD PRESSURE: 107 MMHG

## 2022-07-06 DIAGNOSIS — R51.9 HEADACHE WITH NEUROLOGIC DEFICIT: ICD-10-CM

## 2022-07-06 DIAGNOSIS — R29.818 HEADACHE WITH NEUROLOGIC DEFICIT: ICD-10-CM

## 2022-07-06 DIAGNOSIS — R51.9 HEADACHE: Primary | ICD-10-CM

## 2022-07-06 LAB
ANION GAP SERPL CALCULATED.3IONS-SCNC: 7 MMOL/L (ref 4–13)
APTT PPP: 28 SECONDS (ref 23–37)
BUN SERPL-MCNC: 14 MG/DL (ref 5–25)
CALCIUM SERPL-MCNC: 8.1 MG/DL (ref 8.3–10.1)
CARDIAC TROPONIN I PNL SERPL HS: <2 NG/L
CARDIAC TROPONIN I PNL SERPL HS: <2 NG/L
CHLORIDE SERPL-SCNC: 102 MMOL/L (ref 100–108)
CO2 SERPL-SCNC: 30 MMOL/L (ref 21–32)
CREAT SERPL-MCNC: 0.73 MG/DL (ref 0.6–1.3)
ERYTHROCYTE [DISTWIDTH] IN BLOOD BY AUTOMATED COUNT: 12.5 % (ref 11.6–15.1)
GFR SERPL CREATININE-BSD FRML MDRD: 101 ML/MIN/1.73SQ M
GLUCOSE SERPL-MCNC: 84 MG/DL (ref 65–140)
GLUCOSE SERPL-MCNC: 93 MG/DL (ref 65–140)
HCT VFR BLD AUTO: 44.1 % (ref 34.8–46.1)
HGB BLD-MCNC: 14.5 G/DL (ref 11.5–15.4)
INR PPP: 0.95 (ref 0.84–1.19)
MCH RBC QN AUTO: 29.1 PG (ref 26.8–34.3)
MCHC RBC AUTO-ENTMCNC: 32.9 G/DL (ref 31.4–37.4)
MCV RBC AUTO: 89 FL (ref 82–98)
PLATELET # BLD AUTO: 224 THOUSANDS/UL (ref 149–390)
PMV BLD AUTO: 9.2 FL (ref 8.9–12.7)
POTASSIUM SERPL-SCNC: 3.4 MMOL/L (ref 3.5–5.3)
PROTHROMBIN TIME: 12.6 SECONDS (ref 11.6–14.5)
RBC # BLD AUTO: 4.98 MILLION/UL (ref 3.81–5.12)
SODIUM SERPL-SCNC: 139 MMOL/L (ref 136–145)
WBC # BLD AUTO: 5.29 THOUSAND/UL (ref 4.31–10.16)

## 2022-07-06 PROCEDURE — 93005 ELECTROCARDIOGRAM TRACING: CPT

## 2022-07-06 PROCEDURE — 85610 PROTHROMBIN TIME: CPT | Performed by: EMERGENCY MEDICINE

## 2022-07-06 PROCEDURE — 85730 THROMBOPLASTIN TIME PARTIAL: CPT | Performed by: EMERGENCY MEDICINE

## 2022-07-06 PROCEDURE — 70551 MRI BRAIN STEM W/O DYE: CPT

## 2022-07-06 PROCEDURE — 96375 TX/PRO/DX INJ NEW DRUG ADDON: CPT

## 2022-07-06 PROCEDURE — 96374 THER/PROPH/DIAG INJ IV PUSH: CPT

## 2022-07-06 PROCEDURE — 99285 EMERGENCY DEPT VISIT HI MDM: CPT | Performed by: EMERGENCY MEDICINE

## 2022-07-06 PROCEDURE — 85027 COMPLETE CBC AUTOMATED: CPT | Performed by: EMERGENCY MEDICINE

## 2022-07-06 PROCEDURE — 70498 CT ANGIOGRAPHY NECK: CPT

## 2022-07-06 PROCEDURE — 82948 REAGENT STRIP/BLOOD GLUCOSE: CPT

## 2022-07-06 PROCEDURE — 96361 HYDRATE IV INFUSION ADD-ON: CPT

## 2022-07-06 PROCEDURE — 99284 EMERGENCY DEPT VISIT MOD MDM: CPT

## 2022-07-06 PROCEDURE — 36415 COLL VENOUS BLD VENIPUNCTURE: CPT | Performed by: EMERGENCY MEDICINE

## 2022-07-06 PROCEDURE — 84484 ASSAY OF TROPONIN QUANT: CPT | Performed by: EMERGENCY MEDICINE

## 2022-07-06 PROCEDURE — 80048 BASIC METABOLIC PNL TOTAL CA: CPT | Performed by: EMERGENCY MEDICINE

## 2022-07-06 PROCEDURE — 70496 CT ANGIOGRAPHY HEAD: CPT

## 2022-07-06 RX ORDER — DIPHENHYDRAMINE HYDROCHLORIDE 50 MG/ML
25 INJECTION INTRAMUSCULAR; INTRAVENOUS ONCE
Status: COMPLETED | OUTPATIENT
Start: 2022-07-06 | End: 2022-07-06

## 2022-07-06 RX ORDER — KETOROLAC TROMETHAMINE 30 MG/ML
30 INJECTION, SOLUTION INTRAMUSCULAR; INTRAVENOUS ONCE
Status: COMPLETED | OUTPATIENT
Start: 2022-07-06 | End: 2022-07-06

## 2022-07-06 RX ORDER — METOCLOPRAMIDE HYDROCHLORIDE 5 MG/ML
10 INJECTION INTRAMUSCULAR; INTRAVENOUS ONCE
Status: COMPLETED | OUTPATIENT
Start: 2022-07-06 | End: 2022-07-06

## 2022-07-06 RX ADMIN — SODIUM CHLORIDE 1000 ML: 0.9 INJECTION, SOLUTION INTRAVENOUS at 14:59

## 2022-07-06 RX ADMIN — IOHEXOL 70 ML: 350 INJECTION, SOLUTION INTRAVENOUS at 14:51

## 2022-07-06 RX ADMIN — KETOROLAC TROMETHAMINE 30 MG: 30 INJECTION, SOLUTION INTRAMUSCULAR at 15:19

## 2022-07-06 RX ADMIN — DIPHENHYDRAMINE HYDROCHLORIDE 25 MG: 50 INJECTION, SOLUTION INTRAMUSCULAR; INTRAVENOUS at 15:19

## 2022-07-06 RX ADMIN — METOCLOPRAMIDE HYDROCHLORIDE 10 MG: 5 INJECTION INTRAMUSCULAR; INTRAVENOUS at 15:19

## 2022-07-06 NOTE — Clinical Note
Jacqui Sanches was seen and treated in our emergency department on 7/6/2022  Off work today    Diagnosis:     Renetta Dose    She may return on this date: If you have any questions or concerns, please don't hesitate to call        Judeen Lesches, DO    ______________________________           _______________          _______________  Hospital Representative                              Date                                Time

## 2022-07-06 NOTE — ED PROVIDER NOTES
History  Chief Complaint   Patient presents with    Headache     Presents due to headache with dizziness and then intermittent numbness in L arm and L side of tongue along with neck pain  Pt reports she was lifting pallets and it started  No pronator drift, no facial drooping, no slurring of speech      Patient is a 49-year-old female with no significant past medical history presents the emergency department with acute onset of headache today around 1:00 p m  Associated with blurriness in the vision described as seeing heat vapors in her bilateral visual fields associated with 7/10 headache and numbness in the left side of the face and left forearm and hand  No weakness no slurred speech or facial asymmetry  History provided by:  Patient  Headache  Pain location:  Generalized  Onset quality:  Sudden  Duration:  1 hour  Timing:  Constant  Progression:  Worsening  Chronicity:  New  Similar to prior headaches: no    Associated symptoms: numbness    Associated symptoms: no abdominal pain, no congestion, no cough, no diarrhea, no dizziness, no ear pain, no fatigue, no fever, no myalgias, no nausea, no sore throat, no vomiting and no weakness        Prior to Admission Medications   Prescriptions Last Dose Informant Patient Reported? Taking?   fexofenadine (ALLEGRA) 180 MG tablet Not Taking at Unknown time Self Yes No   Sig: Take 180 mg by mouth daily   Patient not taking: Reported on 7/6/2022   montelukast (SINGULAIR) 10 mg tablet Not Taking at Unknown time Self Yes No   Sig: Take 10 mg by mouth daily at bedtime   Patient not taking: Reported on 7/6/2022      Facility-Administered Medications: None       Past Medical History:   Diagnosis Date    Depression        Past Surgical History:   Procedure Laterality Date    TUBAL LIGATION         History reviewed  No pertinent family history  I have reviewed and agree with the history as documented      E-Cigarette/Vaping    E-Cigarette Use Never User E-Cigarette/Vaping Substances     Social History     Tobacco Use    Smoking status: Never Smoker    Smokeless tobacco: Never Used   Vaping Use    Vaping Use: Never used   Substance Use Topics    Alcohol use: Never    Drug use: Never       Review of Systems   Constitutional: Negative for activity change, appetite change, chills, fatigue and fever  HENT: Negative for congestion, ear pain, rhinorrhea and sore throat  Eyes: Positive for visual disturbance  Negative for discharge and redness  Respiratory: Negative for cough, chest tightness, shortness of breath and wheezing  Cardiovascular: Negative for chest pain and palpitations  Gastrointestinal: Negative for abdominal pain, constipation, diarrhea, nausea and vomiting  Endocrine: Negative for polydipsia and polyuria  Genitourinary: Negative for difficulty urinating, dysuria, frequency, hematuria and urgency  Musculoskeletal: Negative for arthralgias and myalgias  Skin: Negative for color change, pallor and rash  Neurological: Positive for numbness and headaches  Negative for dizziness, weakness and light-headedness  Hematological: Negative for adenopathy  Does not bruise/bleed easily  All other systems reviewed and are negative  Physical Exam  Physical Exam  Vitals and nursing note reviewed  Constitutional:       Appearance: She is well-developed  HENT:      Head: Normocephalic and atraumatic  Right Ear: External ear normal       Left Ear: External ear normal       Nose: Nose normal    Eyes:      Conjunctiva/sclera: Conjunctivae normal       Pupils: Pupils are equal, round, and reactive to light  Cardiovascular:      Rate and Rhythm: Normal rate and regular rhythm  Heart sounds: Normal heart sounds  Pulmonary:      Effort: Pulmonary effort is normal  No respiratory distress  Breath sounds: Normal breath sounds  No wheezing or rales  Chest:      Chest wall: No tenderness     Abdominal:      General: Bowel sounds are normal  There is no distension  Palpations: Abdomen is soft  Tenderness: There is no abdominal tenderness  There is no guarding  Musculoskeletal:         General: Normal range of motion  Cervical back: Normal range of motion and neck supple  Skin:     General: Skin is warm and dry  Neurological:      Mental Status: She is alert and oriented to person, place, and time  Cranial Nerves: No cranial nerve deficit  Sensory: Sensation is intact  No sensory deficit  Motor: Motor function is intact  Coordination: Coordination is intact  Vital Signs  ED Triage Vitals [07/06/22 1430]   Temperature Pulse Respirations Blood Pressure SpO2   (!) 96 7 °F (35 9 °C) 69 18 118/85 99 %      Temp Source Heart Rate Source Patient Position - Orthostatic VS BP Location FiO2 (%)   Temporal Monitor Sitting Right arm --      Pain Score       7           Vitals:    07/06/22 1515 07/06/22 1530 07/06/22 1545 07/06/22 1630   BP: 108/64 111/65 118/80 108/67   Pulse: 62 63 70 61   Patient Position - Orthostatic VS: Lying Lying Lying Lying         Visual Acuity  Visual Acuity    Flowsheet Row Most Recent Value   L Pupil Size (mm) 3   R Pupil Size (mm) 3          ED Medications  Medications   sodium chloride 0 9 % bolus 1,000 mL (1,000 mL Intravenous New Bag 7/6/22 1459)   iohexol (OMNIPAQUE) 350 MG/ML injection (MULTI-DOSE) 70 mL (70 mL Intravenous Given 7/6/22 1451)   ketorolac (TORADOL) injection 30 mg (30 mg Intravenous Given 7/6/22 1519)   diphenhydrAMINE (BENADRYL) injection 25 mg (25 mg Intravenous Given 7/6/22 1519)   metoclopramide (REGLAN) injection 10 mg (10 mg Intravenous Given 7/6/22 1519)       Diagnostic Studies  Results Reviewed     Procedure Component Value Units Date/Time    HS Troponin I 2hr [[id number]] Collected: 07/06/22 1637    Lab Status:  In process Specimen: Blood from Arm, Right Updated: 07/06/22 1639    HS Troponin I 4hr [[id number]]     Lab Status: No result Specimen: Blood     HS Troponin 0hr (reflex protocol) [[id number]]  (Normal) Collected: 07/06/22 1443    Lab Status: Final result Specimen: Blood from Arm, Right Updated: 07/06/22 1515     hs TnI 0hr <2 ng/L     Protime-INR [[id number]]  (Normal) Collected: 07/06/22 1443    Lab Status: Final result Specimen: Blood from Arm, Right Updated: 07/06/22 1504     Protime 12 6 seconds      INR 0 95    APTT [[id number]]  (Normal) Collected: 07/06/22 1443    Lab Status: Final result Specimen: Blood from Arm, Right Updated: 07/06/22 1504     PTT 28 seconds     Basic metabolic panel [[id number]]  (Abnormal) Collected: 07/06/22 1443    Lab Status: Final result Specimen: Blood from Arm, Right Updated: 07/06/22 1501     Sodium 139 mmol/L      Potassium 3 4 mmol/L      Chloride 102 mmol/L      CO2 30 mmol/L      ANION GAP 7 mmol/L      BUN 14 mg/dL      Creatinine 0 73 mg/dL      Glucose 93 mg/dL      Calcium 8 1 mg/dL      eGFR 101 ml/min/1 73sq m     Narrative:      [place] guidelines for Chronic Kidney Disease (CKD):     Stage 1 with normal or high GFR (GFR > 90 mL/min/1 73 square meters)    Stage 2 Mild CKD (GFR = 60-89 mL/min/1 73 square meters)    Stage 3A Moderate CKD (GFR = 45-59 mL/min/1 73 square meters)    Stage 3B Moderate CKD (GFR = 30-44 mL/min/1 73 square meters)    Stage 4 Severe CKD (GFR = 15-29 mL/min/1 73 square meters)    Stage 5 End Stage CKD (GFR <15 mL/min/1 73 square meters)  Note: GFR calculation is accurate only with a steady state creatinine    CBC and Platelet [[id number]]  (Normal) Collected: 07/06/22 1443    Lab Status: Final result Specimen: Blood from Arm, Right Updated: 07/06/22 1450     WBC 5 29 Thousand/uL      RBC 4 98 Million/uL      Hemoglobin 14 5 g/dL      Hematocrit 44 1 %      MCV 89 fL      MCH 29 1 pg      MCHC 32 9 g/dL      RDW 12 5 %      Platelets 017 Thousands/uL      MPV 9 2 fL     Fingerstick Glucose (POCT) [[id number]]  (Normal) Collected: 07/06/22 8812 Lab Status: Final result Updated: 07/06/22 1443     POC Glucose 84 mg/dl                  MRI brain wo contrast   Final Result by Lizbeth Marrero MD (07/06 1624)      No MR evidence of acute ischemia  Workstation performed: [id number]         CTA stroke alert (head/neck)   Final Result by Cassius Braswell MD (07/06 8896)   No significant stenosis of the cervical carotid or vertebral arteries   No intracranial stenosis, large vessel occlusion or aneurysm  Findings were reported to Reginald Ashley  via HIPAA compliant secure electronic messaging at 3:03 PM       Study marked in Epic for notification of incidental pulmonary nodule  Workstation performed: [id number]         CT stroke alert brain   Final Result by Cassius Braswell MD (07/06 8516)      No acute intracranial abnormality  Findings were reported to Reginald Ashley  via HIPAA compliant secure electronic messaging at 3:03 PM       Workstation performed: [id number]                    Procedures  ECG 12 Lead Documentation Only    Date/Time: 7/6/2022 3:08 PM  Performed by: Isaak May DO  Authorized by: Isaak May DO     ECG reviewed by me, the ED Provider: yes    Patient location:  ED  Previous ECG:     Comparison to cardiac monitor: Yes    Quality:     Tracing quality:  Limited by artifact  Rate:     ECG rate:  72    ECG rate assessment: normal    Rhythm:     Rhythm: sinus rhythm    QRS:     QRS axis:  Right    QRS intervals: Wide  Conduction:     Conduction: abnormal      Abnormal conduction: incomplete RBBB    ST segments:     ST segments:  Normal  T waves:     T waves: normal               ED Course  ED Course as of 07/06/22 1700   Wed Jul 06, 2022   1449 Spoke with Dr Luigi Walls the stroke neurologist on-call reviewed case and findings in the emergency department       [address] [place] [address] Dr Luigi Walls with neurology has reviewed CT head and CT angiograms no acute abnormality or large vessel occlusion at this point recommends trial of migraine cocktail and recommends obtaining MRI of brain with and without for further evaluation if negative and headache improved suspect patient could be discharged from the ED      1659 Headache and left arm and face numbness are now entirely resolved in the ED at this time patient is asymptomatic feels well wishes to return home workup in the ED is unremarkable symptoms most consistent with acute neurologic migraine/headache with neurologic features  Advised supportive care for now and prompt follow-up with primary physician for further evaluation and treatment return precautions and anticipatory guidance discussed                      Stroke Assessment     Row Name 07/06/22 1275             NIH Stroke Scale    Interval --      Level of Consciousness (1a ) 0      LOC Questions (1b ) 0      LOC Commands (1c ) 0      Best Gaze (2 ) 0      Visual (3 ) 0      Facial Palsy (4 ) 0      Motor Arm, Left (5a ) 0      Motor Arm, Right (5b ) 0      Motor Leg, Left (6a ) 0      Motor Leg, Right (6b ) 0      Limb Ataxia (7 ) 0      Sensory (8 ) 1      Best Language (9 ) 0      Dysarthria (10 ) 0      Extinction and Inattention (11 ) (Formerly Neglect) 0      Total 1                                          MDM  Number of Diagnoses or Management Options  Headache with neurologic deficit: new and requires workup  Headache: new and requires workup     Amount and/or Complexity of Data Reviewed  Clinical lab tests: reviewed and ordered  Tests in the radiology section of CPT®: ordered and reviewed  Tests in the medicine section of CPT®: ordered and reviewed  Decide to obtain previous medical records or to obtain history from someone other than the patient: yes  Review and summarize past medical records: yes  Independent visualization of images, tracings, or specimens: yes    Risk of Complications, Morbidity, and/or Mortality  Presenting problems: moderate  Diagnostic procedures: moderate  Management options: moderate    Patient Progress  Patient progress: stable      Disposition  Final diagnoses:   Headache   Headache with neurologic deficit - Resolved in ED with migraine treatment     Time reflects when diagnosis was documented in both MDM as applicable and the Disposition within this note     Time User Action Codes Description Comment    7/6/2022  2:38 PM Ash Bailey Add [R51 9] Headache     7/6/2022  4:54 PM Rashad Thompson Add [R51 9,  R29 818] Headache with neurologic deficit     7/6/2022  4:54 PM Aleman Carolina Modify [R51 9,  R29 818] Headache with neurologic deficit Resolve    7/6/2022  4:54 PM Rashad Thompson Modify [R51 9,  R29 818] Headache with neurologic deficit Resolved in ED with migraine treatment      ED Disposition     ED Disposition   Discharge    Condition   Stable    Date/Time   Wed Jul 6, 2022  4:53 PM    [facility] discharge to home/self care  Follow-up Information     Follow up With Specialties Details Why Dmitriy Mendieta MD Family Medicine Schedule an appointment as soon as possible for a visit in 3 days  [place] [phone] [facility] [place]  [phone]            Patient's Medications   Discharge Prescriptions    No medications on file       No discharge procedures on file      PDMP Review     None          ED Provider  Electronically Signed by           Miguel A Mccarthy DO  07/06/22 1718

## 2022-07-06 NOTE — STROKE DOCUMENTATION
Pt reansported to MRI with tech  Pt reports paraesthesia in LUE resolved  Reports HA improved   Pt ok to travel to MRI off monitor per Dr Atul Nazario

## 2022-07-06 NOTE — QUICK NOTE
Stroke Alert Note   Vanessa Hastings 43 y o  female  MRN: [id number]   Unit/Bed#: ED 01 Encounter: [id number]     Stroke alert text received at: 2:38pm  Neurology response by phone was immediate    42F with history of depression, presented with headache, visual disturbance described as similar to heat waves rising off a hot road, and numbness/tingling in left hand/forearm and left side of tongue  Symptoms were acute onset at 1pm with holocephalic headache  Per the ED she admitted that as she became more anxious the numbness/tingling worsened  Headache also associated with light sensitivity and nausea  NIHSSS 1    CT head wo: unremakrable  CTA head/neck: unremarkable    IV tPA was not given due to low suspicion for stroke with nondisabling symptoms  More consistent with migraine     - Migraine cocktail recommended  - still needs workup with MRI brain w/wo  - if MRI negative and symptoms resolved with migraine cocktail, OK for discharge with outpatient follow up without additional workup      Teressa Marie MD

## 2022-07-06 NOTE — ED NOTES
Patient discharged to home  Verbalized understanding of discharge instructions and need for follow up care  IV dc'd intact         George Nowak RN  07/06/22 3244

## 2022-07-07 LAB
ATRIAL RATE: 72 BPM
P AXIS: 78 DEGREES
PR INTERVAL: 166 MS
QRS AXIS: 83 DEGREES
QRSD INTERVAL: 108 MS
QT INTERVAL: 418 MS
QTC INTERVAL: 457 MS
T WAVE AXIS: 67 DEGREES
VENTRICULAR RATE: 72 BPM

## 2022-07-07 PROCEDURE — 93010 ELECTROCARDIOGRAM REPORT: CPT | Performed by: INTERNAL MEDICINE

## 2022-07-29 ENCOUNTER — APPOINTMENT (EMERGENCY)
Dept: CT IMAGING | Facility: HOSPITAL | Age: 42
End: 2022-07-29
Payer: COMMERCIAL

## 2022-07-29 ENCOUNTER — HOSPITAL ENCOUNTER (EMERGENCY)
Facility: HOSPITAL | Age: 42
Discharge: HOME | End: 2022-07-29
Attending: EMERGENCY MEDICINE | Admitting: EMERGENCY MEDICINE
Payer: COMMERCIAL

## 2022-07-29 ENCOUNTER — TELEPHONE (OUTPATIENT)
Dept: NEUROLOGY | Facility: CLINIC | Age: 42
End: 2022-07-29

## 2022-07-29 VITALS
DIASTOLIC BLOOD PRESSURE: 62 MMHG | SYSTOLIC BLOOD PRESSURE: 109 MMHG | RESPIRATION RATE: 17 BRPM | HEART RATE: 62 BPM | HEIGHT: 65 IN | WEIGHT: 209.22 LBS | OXYGEN SATURATION: 98 % | BODY MASS INDEX: 34.86 KG/M2 | TEMPERATURE: 97.3 F

## 2022-07-29 DIAGNOSIS — R51.9 HEADACHE: Primary | ICD-10-CM

## 2022-07-29 LAB
ALBUMIN SERPL BCP-MCNC: 3.9 G/DL (ref 3.5–5)
ALP SERPL-CCNC: 47 U/L (ref 46–116)
ALT SERPL W P-5'-P-CCNC: 20 U/L (ref 12–78)
ANION GAP SERPL CALCULATED.3IONS-SCNC: 9 MMOL/L (ref 4–13)
APTT PPP: 25 SECONDS (ref 23–37)
AST SERPL W P-5'-P-CCNC: 13 U/L (ref 5–45)
BASOPHILS # BLD AUTO: 0.05 THOUSANDS/ΜL (ref 0–0.1)
BASOPHILS NFR BLD AUTO: 1 % (ref 0–1)
BILIRUB SERPL-MCNC: 0.29 MG/DL (ref 0.2–1)
BILIRUB UR QL STRIP: NEGATIVE
BUN SERPL-MCNC: 18 MG/DL (ref 5–25)
CALCIUM SERPL-MCNC: 8.2 MG/DL (ref 8.3–10.1)
CARDIAC TROPONIN I PNL SERPL HS: <2 NG/L
CHLORIDE SERPL-SCNC: 103 MMOL/L (ref 96–108)
CLARITY UR: CLEAR
CO2 SERPL-SCNC: 26 MMOL/L (ref 21–32)
COLOR UR: YELLOW
CREAT SERPL-MCNC: 0.78 MG/DL (ref 0.6–1.3)
EOSINOPHIL # BLD AUTO: 0.08 THOUSAND/ΜL (ref 0–0.61)
EOSINOPHIL NFR BLD AUTO: 1 % (ref 0–6)
ERYTHROCYTE [DISTWIDTH] IN BLOOD BY AUTOMATED COUNT: 12.7 % (ref 11.6–15.1)
EXT PREG TEST URINE: NEGATIVE
EXT. CONTROL ED NAV: NORMAL
GFR SERPL CREATININE-BSD FRML MDRD: 94 ML/MIN/1.73SQ M
GLUCOSE SERPL-MCNC: 87 MG/DL (ref 65–140)
GLUCOSE SERPL-MCNC: 90 MG/DL (ref 65–140)
GLUCOSE UR STRIP-MCNC: NEGATIVE MG/DL
HCT VFR BLD AUTO: 42.6 % (ref 34.8–46.1)
HGB BLD-MCNC: 14.1 G/DL (ref 11.5–15.4)
HGB UR QL STRIP.AUTO: NEGATIVE
IMM GRANULOCYTES # BLD AUTO: 0.03 THOUSAND/UL (ref 0–0.2)
IMM GRANULOCYTES NFR BLD AUTO: 1 % (ref 0–2)
INR PPP: 0.93 (ref 0.84–1.19)
KETONES UR STRIP-MCNC: NEGATIVE MG/DL
LEUKOCYTE ESTERASE UR QL STRIP: NEGATIVE
LYMPHOCYTES # BLD AUTO: 1.45 THOUSANDS/ΜL (ref 0.6–4.47)
LYMPHOCYTES NFR BLD AUTO: 25 % (ref 14–44)
MAGNESIUM SERPL-MCNC: 1.9 MG/DL (ref 1.6–2.6)
MCH RBC QN AUTO: 29.3 PG (ref 26.8–34.3)
MCHC RBC AUTO-ENTMCNC: 33.1 G/DL (ref 31.4–37.4)
MCV RBC AUTO: 88 FL (ref 82–98)
MONOCYTES # BLD AUTO: 0.54 THOUSAND/ΜL (ref 0.17–1.22)
MONOCYTES NFR BLD AUTO: 9 % (ref 4–12)
NEUTROPHILS # BLD AUTO: 3.74 THOUSANDS/ΜL (ref 1.85–7.62)
NEUTS SEG NFR BLD AUTO: 63 % (ref 43–75)
NITRITE UR QL STRIP: NEGATIVE
NRBC BLD AUTO-RTO: 0 /100 WBCS
PH UR STRIP.AUTO: 6 [PH]
PLATELET # BLD AUTO: 196 THOUSANDS/UL (ref 149–390)
PMV BLD AUTO: 9.5 FL (ref 8.9–12.7)
POTASSIUM SERPL-SCNC: 4.3 MMOL/L (ref 3.5–5.3)
PROT SERPL-MCNC: 7.1 G/DL (ref 6.4–8.4)
PROT UR STRIP-MCNC: NEGATIVE MG/DL
PROTHROMBIN TIME: 12.5 SECONDS (ref 11.6–14.5)
RBC # BLD AUTO: 4.82 MILLION/UL (ref 3.81–5.12)
SODIUM SERPL-SCNC: 138 MMOL/L (ref 135–147)
SP GR UR STRIP.AUTO: <=1.005 (ref 1–1.03)
UROBILINOGEN UR QL STRIP.AUTO: 0.2 E.U./DL
WBC # BLD AUTO: 5.89 THOUSAND/UL (ref 4.31–10.16)

## 2022-07-29 PROCEDURE — 85610 PROTHROMBIN TIME: CPT | Performed by: EMERGENCY MEDICINE

## 2022-07-29 PROCEDURE — 85025 COMPLETE CBC W/AUTO DIFF WBC: CPT | Performed by: EMERGENCY MEDICINE

## 2022-07-29 PROCEDURE — 83735 ASSAY OF MAGNESIUM: CPT | Performed by: EMERGENCY MEDICINE

## 2022-07-29 PROCEDURE — 96361 HYDRATE IV INFUSION ADD-ON: CPT

## 2022-07-29 PROCEDURE — 96374 THER/PROPH/DIAG INJ IV PUSH: CPT

## 2022-07-29 PROCEDURE — 82948 REAGENT STRIP/BLOOD GLUCOSE: CPT

## 2022-07-29 PROCEDURE — 80053 COMPREHEN METABOLIC PANEL: CPT | Performed by: EMERGENCY MEDICINE

## 2022-07-29 PROCEDURE — 96375 TX/PRO/DX INJ NEW DRUG ADDON: CPT

## 2022-07-29 PROCEDURE — 93005 ELECTROCARDIOGRAM TRACING: CPT

## 2022-07-29 PROCEDURE — 85730 THROMBOPLASTIN TIME PARTIAL: CPT | Performed by: EMERGENCY MEDICINE

## 2022-07-29 PROCEDURE — 84484 ASSAY OF TROPONIN QUANT: CPT | Performed by: EMERGENCY MEDICINE

## 2022-07-29 PROCEDURE — 99285 EMERGENCY DEPT VISIT HI MDM: CPT | Performed by: EMERGENCY MEDICINE

## 2022-07-29 PROCEDURE — 99284 EMERGENCY DEPT VISIT MOD MDM: CPT

## 2022-07-29 PROCEDURE — 81025 URINE PREGNANCY TEST: CPT | Performed by: EMERGENCY MEDICINE

## 2022-07-29 PROCEDURE — 70450 CT HEAD/BRAIN W/O DYE: CPT

## 2022-07-29 PROCEDURE — 36415 COLL VENOUS BLD VENIPUNCTURE: CPT | Performed by: EMERGENCY MEDICINE

## 2022-07-29 PROCEDURE — 81003 URINALYSIS AUTO W/O SCOPE: CPT | Performed by: EMERGENCY MEDICINE

## 2022-07-29 RX ORDER — KETOROLAC TROMETHAMINE 30 MG/ML
30 INJECTION, SOLUTION INTRAMUSCULAR; INTRAVENOUS ONCE
Status: COMPLETED | OUTPATIENT
Start: 2022-07-29 | End: 2022-07-29

## 2022-07-29 RX ORDER — DEXAMETHASONE SODIUM PHOSPHATE 4 MG/ML
10 INJECTION, SOLUTION INTRA-ARTICULAR; INTRALESIONAL; INTRAMUSCULAR; INTRAVENOUS; SOFT TISSUE ONCE
Status: COMPLETED | OUTPATIENT
Start: 2022-07-29 | End: 2022-07-29

## 2022-07-29 RX ORDER — DIPHENHYDRAMINE HYDROCHLORIDE 50 MG/ML
25 INJECTION INTRAMUSCULAR; INTRAVENOUS ONCE
Status: COMPLETED | OUTPATIENT
Start: 2022-07-29 | End: 2022-07-29

## 2022-07-29 RX ORDER — METOCLOPRAMIDE HYDROCHLORIDE 5 MG/ML
10 INJECTION INTRAMUSCULAR; INTRAVENOUS ONCE
Status: COMPLETED | OUTPATIENT
Start: 2022-07-29 | End: 2022-07-29

## 2022-07-29 RX ADMIN — SODIUM CHLORIDE 1000 ML: 9 INJECTION, SOLUTION INTRAVENOUS at 08:50

## 2022-07-29 RX ADMIN — METOCLOPRAMIDE HYDROCHLORIDE 10 MG: 5 INJECTION INTRAMUSCULAR; INTRAVENOUS at 08:51

## 2022-07-29 RX ADMIN — DEXAMETHASONE SODIUM PHOSPHATE 10 MG: 4 INJECTION, SOLUTION INTRAMUSCULAR; INTRAVENOUS at 09:33

## 2022-07-29 RX ADMIN — DIPHENHYDRAMINE HYDROCHLORIDE 25 MG: 50 INJECTION, SOLUTION INTRAMUSCULAR; INTRAVENOUS at 08:50

## 2022-07-29 RX ADMIN — KETOROLAC TROMETHAMINE 30 MG: 30 INJECTION, SOLUTION INTRAMUSCULAR at 08:40

## 2022-07-29 NOTE — Clinical Note
Kathryn Lundy was seen and treated in our emergency department on 7/29/2022  Off work today and tomorrow    Diagnosis:     Barrett Finder    She may return on this date: If you have any questions or concerns, please don't hesitate to call        Jaren Bailey DO    ______________________________           _______________          _______________  Hospital Representative                              Date                                Time

## 2022-07-29 NOTE — ED PROVIDER NOTES
History  Chief Complaint   Patient presents with    Headache     Pt reports R sided blurred vision along with slurred speech when she called her PCP office around 0730, told pt to go to ED for eval, since arrival to ED pt reports blurred vision/slurred speech is gone however is still having a headache  Patient is a 77-year-old female with history of recently diagnosed neurologic migraine seen in the ED as stroke alert for nearly identical symptoms about 3 weeks ago  Patient had headache at time as well as oily blurred vision in the right eye and tingling in the left upper extremity  Workup at that time including MRI and CT angiogram was unremarkable and the patient had followed up with her PCP recently  Patient reports that today about 1 hour prior to arrival while at work she developed some blurriness in the vision in the right eye initially associated with a very mild 2/10 headache  Patient called PCP for advice over the phone while the patient was upset office staff felt like her speech was not correct  Patient denies having slurred speech but felt like she was having trouble getting words out this has now entirely resolved  The patient has no new numbness or weakness and no difficulty speaking at this time  The symptoms are described as being very similar and have been occasionally recurrent for her since the initial event about 3 weeks ago  Patient did take Aleve with the onset of the symptoms of the visual aura and headache and believes that this is why her symptoms are improving now          History provided by:  Patient  Headache  Pain location:  R parietal  Quality:  Dull  Onset quality:  Sudden  Duration:  1 hour  Timing:  Constant  Progression:  Improving  Chronicity:  Recurrent  Similar to prior headaches: yes    Associated symptoms: no abdominal pain, no congestion, no cough, no diarrhea, no dizziness, no ear pain, no fatigue, no fever, no myalgias, no nausea, no numbness, no sore throat, no vomiting and no weakness        Prior to Admission Medications   Prescriptions Last Dose Informant Patient Reported? Taking?   fexofenadine (ALLEGRA) 180 MG tablet Not Taking at Unknown time  Yes No   Sig: Take 180 mg by mouth daily   Patient not taking: No sig reported   montelukast (SINGULAIR) 10 mg tablet Not Taking at Unknown time  Yes No   Sig: Take 10 mg by mouth daily at bedtime   Patient not taking: No sig reported      Facility-Administered Medications: None       Past Medical History:   Diagnosis Date    Depression        Past Surgical History:   Procedure Laterality Date    TUBAL LIGATION         History reviewed  No pertinent family history  I have reviewed and agree with the history as documented  E-Cigarette/Vaping    E-Cigarette Use Never User      E-Cigarette/Vaping Substances     Social History     Tobacco Use    Smoking status: Never Smoker    Smokeless tobacco: Never Used   Vaping Use    Vaping Use: Never used   Substance Use Topics    Alcohol use: Never    Drug use: Never       Review of Systems   Constitutional: Negative for activity change, appetite change, chills, fatigue and fever  HENT: Negative for congestion, ear pain, rhinorrhea and sore throat  Eyes: Positive for visual disturbance  Negative for discharge and redness  Respiratory: Negative for cough, chest tightness, shortness of breath and wheezing  Cardiovascular: Negative for chest pain and palpitations  Gastrointestinal: Negative for abdominal pain, constipation, diarrhea, nausea and vomiting  Endocrine: Negative for polydipsia and polyuria  Genitourinary: Negative for difficulty urinating, dysuria, frequency, hematuria and urgency  Musculoskeletal: Negative for arthralgias and myalgias  Skin: Negative for color change, pallor and rash  Neurological: Positive for speech difficulty and headaches  Negative for dizziness, weakness, light-headedness and numbness     Hematological: Negative for adenopathy  Does not bruise/bleed easily  Psychiatric/Behavioral: The patient is nervous/anxious  All other systems reviewed and are negative  Physical Exam  Physical Exam  Vitals and nursing note reviewed  Constitutional:       Appearance: She is well-developed  HENT:      Head: Normocephalic and atraumatic  Right Ear: External ear normal       Left Ear: External ear normal       Nose: Nose normal    Eyes:      Conjunctiva/sclera: Conjunctivae normal       Pupils: Pupils are equal, round, and reactive to light  Cardiovascular:      Rate and Rhythm: Normal rate and regular rhythm  Heart sounds: Normal heart sounds  Pulmonary:      Effort: Pulmonary effort is normal  No respiratory distress  Breath sounds: Normal breath sounds  No wheezing or rales  Chest:      Chest wall: No tenderness  Abdominal:      General: Bowel sounds are normal  There is no distension  Palpations: Abdomen is soft  Tenderness: There is no abdominal tenderness  There is no guarding  Musculoskeletal:         General: Normal range of motion  Cervical back: Normal range of motion and neck supple  Skin:     General: Skin is warm and dry  Neurological:      Mental Status: She is alert and oriented to person, place, and time  Cranial Nerves: No cranial nerve deficit  Sensory: No sensory deficit           Vital Signs  ED Triage Vitals [07/29/22 0828]   Temperature Pulse Respirations Blood Pressure SpO2   (!) 97 3 °F (36 3 °C) 77 19 126/76 98 %      Temp Source Heart Rate Source Patient Position - Orthostatic VS BP Location FiO2 (%)   Temporal Monitor Sitting Left arm --      Pain Score       3           Vitals:    07/29/22 0828   BP: 126/76   Pulse: 77   Patient Position - Orthostatic VS: Sitting         Visual Acuity      ED Medications  Medications   sodium chloride 0 9 % bolus 1,000 mL (1,000 mL Intravenous New Bag 7/29/22 0850)   ketorolac (TORADOL) injection 30 mg (30 mg Intravenous Given 7/29/22 0840)   diphenhydrAMINE (BENADRYL) injection 25 mg (25 mg Intravenous Given 7/29/22 0850)   metoclopramide (REGLAN) injection 10 mg (10 mg Intravenous Given 7/29/22 0851)   dexamethasone (DECADRON) injection 10 mg (10 mg Intravenous Given 7/29/22 0933)       Diagnostic Studies  Results Reviewed     Procedure Component Value Units Date/Time    UA w Reflex to Microscopic w Reflex to Culture [[id number]] Collected: 07/29/22 0927    Lab Status: Final result Specimen: Urine, Clean Catch Updated: 07/29/22 0946     Color, UA Yellow     Clarity, UA Clear     Specific Gravity, UA <=1 005     pH, UA 6 0     Leukocytes, UA Negative     Nitrite, UA Negative     Protein, UA Negative mg/dl      Glucose, UA Negative mg/dl      Ketones, UA Negative mg/dl      Urobilinogen, UA 0 2 E U /dl      Bilirubin, UA Negative     Occult Blood, UA Negative    HS Troponin 0hr (reflex protocol) [[id number]]  (Normal) Collected: 07/29/22 0834    Lab Status: Final result Specimen: Blood from Arm, Right Updated: 07/29/22 0908     hs TnI 0hr <2 ng/L     Comprehensive metabolic panel [[id number]]  (Abnormal) Collected: 07/29/22 0834    Lab Status: Final result Specimen: Blood from Arm, Right Updated: 07/29/22 0902     Sodium 138 mmol/L      Potassium 4 3 mmol/L      Chloride 103 mmol/L      CO2 26 mmol/L      ANION GAP 9 mmol/L      BUN 18 mg/dL      Creatinine 0 78 mg/dL      Glucose 90 mg/dL      Calcium 8 2 mg/dL      AST 13 U/L      ALT 20 U/L      Alkaline Phosphatase 47 U/L      Total Protein 7 1 g/dL      Albumin 3 9 g/dL      Total Bilirubin 0 29 mg/dL      eGFR 94 ml/min/1 73sq m     Narrative:      [place] guidelines for Chronic Kidney Disease (CKD):     Stage 1 with normal or high GFR (GFR > 90 mL/min/1 73 square meters)    Stage 2 Mild CKD (GFR = 60-89 mL/min/1 73 square meters)    Stage 3A Moderate CKD (GFR = 45-59 mL/min/1 73 square meters)    Stage 3B Moderate CKD (GFR = 30-44 mL/min/1 73 square meters)    Stage 4 Severe CKD (GFR = 15-29 mL/min/1 73 square meters)    Stage 5 End Stage CKD (GFR <15 mL/min/1 73 square meters)  Note: GFR calculation is accurate only with a steady state creatinine    Magnesium [[id number]]  (Normal) Collected: 07/29/22 0834    Lab Status: Final result Specimen: Blood from Arm, Right Updated: 07/29/22 0902     Magnesium 1 9 mg/dL     Protime-INR [[id number]]  (Normal) Collected: 07/29/22 0834    Lab Status: Final result Specimen: Blood from Arm, Right Updated: 07/29/22 0856     Protime 12 5 seconds      INR 0 93    APTT [[id number]]  (Normal) Collected: 07/29/22 0834    Lab Status: Final result Specimen: Blood from Arm, Right Updated: 07/29/22 0856     PTT 25 seconds     CBC and differential [[id number]] Collected: 07/29/22 0834    Lab Status: Final result Specimen: Blood from Arm, Right Updated: 07/29/22 0841     WBC 5 89 Thousand/uL      RBC 4 82 Million/uL      Hemoglobin 14 1 g/dL      Hematocrit 42 6 %      MCV 88 fL      MCH 29 3 pg      MCHC 33 1 g/dL      RDW 12 7 %      MPV 9 5 fL      Platelets 569 Thousands/uL      nRBC 0 /100 WBCs      Neutrophils Relative 63 %      Immat GRANS % 1 %      Lymphocytes Relative 25 %      Monocytes Relative 9 %      Eosinophils Relative 1 %      Basophils Relative 1 %      Neutrophils Absolute 3 74 Thousands/µL      Immature Grans Absolute 0 03 Thousand/uL      Lymphocytes Absolute 1 45 Thousands/µL      Monocytes Absolute 0 54 Thousand/µL      Eosinophils Absolute 0 08 Thousand/µL      Basophils Absolute 0 05 Thousands/µL     POCT pregnancy, urine [[id number]]     Lab Status: No result     Fingerstick Glucose (POCT) [[id number]]  (Normal) Collected: 07/29/22 0828    Lab Status: Final result Updated: 07/29/22 0831     POC Glucose 87 mg/dl                  CT head without contrast   Final Result by Nicole Roman DO (07/29 1066)   No acute intracranial abnormality                    Workstation performed: [id number] Procedures  ECG 12 Lead Documentation Only    Date/Time: 7/29/2022 8:56 AM  Performed by: Rosana Arias DO  Authorized by: Rosana Arias DO     ECG reviewed by me, the ED Provider: yes    Patient location:  ED  Previous ECG:     Comparison to cardiac monitor: Yes    Quality:     Tracing quality:  Limited by artifact  Rate:     ECG rate:  63    ECG rate assessment: normal    Rhythm:     Rhythm: sinus rhythm    QRS:     QRS axis:  Normal    QRS intervals:  Normal  Conduction:     Conduction: normal    ST segments:     ST segments:  Normal  T waves:     T waves: normal               ED Course  ED Course as of 07/29/22 1040   Fri Jul 29, 2022   7844 Stroke alert on activated as patient has normal nonfocal neurologic examination the ED with NIH stroke score of 0 no deficits at present and only symptom is mild headache with history of recently diagnosed migraine with neurologic features and symptoms described most consistent with migraine headache with visual aura  By patient's own report there was no slurring words rather she did have some difficulty communicating over the phone and patient reports being very anxious at the time of this conversation as well      8400 Patient re-evaluated now no neurologic symptoms present but still has some headache will continue with IV fluids and treat with Decadron in addition to awaiting for response to migraine cocktail given  1037 Headache is now entirely resolved patient entirely asymptomatic with normal nonfocal neurologic examination and wishing to return home will provide with referral to neurology and advise rest and supportive care for now over-the-counter meds for headache should they return and prompt follow-up with PCP for further evaluation and treatment obtain test results return precautions and anticipatory guidance discussed                      Stroke Assessment     Row Name 07/29/22 0793             NIH Stroke Scale    Interval -- Level of Consciousness (1a ) 0      LOC Questions (1b ) 0      LOC Commands (1c ) 0      Best Gaze (2 ) 0      Visual (3 ) 0      Facial Palsy (4 ) 0      Motor Arm, Left (5a ) 0      Motor Arm, Right (5b ) 0      Motor Leg, Left (6a ) 0      Motor Leg, Right (6b ) 0      Limb Ataxia (7 ) 0      Sensory (8 ) 0      Best Language (9 ) 0      Dysarthria (10 ) 0      Extinction and Inattention (11 ) (Formerly Neglect) 0      Total 0              Flowsheet Row Most Recent Value   TPA Decision Options    TPA Decision Patient not a TPA candidate  Patient is not a candidate options Symptoms resolved/clearly non disabling  MDM  Number of Diagnoses or Management Options  Headache: established and worsening  Diagnosis management comments: Patient remained clinically hemodynamically neurologically stable in the emergency department workup in the ED reveals no evidence of acute intracranial pathology patient felt entirely well symptoms entirely resolved after rest fluids and migraines cocktail given in the ED  Headache was entirely resolved patient requesting to return home advised prompt follow-up with PCP and Neurology for further evaluation and treatment return precautions and anticipatory guidance discussed           Amount and/or Complexity of Data Reviewed  Clinical lab tests: ordered and reviewed  Tests in the radiology section of CPT®: ordered and reviewed  Tests in the medicine section of CPT®: ordered and reviewed  Decide to obtain previous medical records or to obtain history from someone other than the patient: yes  Review and summarize past medical records: yes  Independent visualization of images, tracings, or specimens: yes    Risk of Complications, Morbidity, and/or Mortality  Presenting problems: moderate  Diagnostic procedures: moderate  Management options: moderate    Patient Progress  Patient progress: stable      Disposition  Final diagnoses:   Headache     Time reflects when diagnosis was documented in both MDM as applicable and the Disposition within this note     Time User Action Codes Description Comment    7/29/2022 10:38 AM Dougie Gage Add [R51 9] Headache       ED Disposition     ED Disposition   Discharge    Condition   Stable    Date/Time   Fri Jul 29, 2022 10:38 AM    Comment   Neri Matthew discharge to home/self care                 Follow-up Information     Follow up With Specialties Details Why Contact Info Additional Information    Whit Leggett MD Family Medicine Schedule an appointment as soon as possible for a visit in 2 days  Barry Allen [address] [place] [address] Neurology [facility] Neurology Schedule an appointment as soon as possible for a visit in 1 week  [address] [place] [id number] [phone] [facility], [address] [place] [address], [id number] [phone]          Patient's Medications   Discharge Prescriptions    No medications on file           PDMP Review     None          ED Provider  Electronically Signed by           Sera Duque DO  07/29/22 9776

## 2022-07-29 NOTE — TELEPHONE ENCOUNTER
1st attempt to reach patient for referral   Due to availability of offices and locations patient refused at this time  If they decide to schedule they will call back

## 2022-08-01 LAB
ATRIAL RATE: 63 BPM
P AXIS: 32 DEGREES
PR INTERVAL: 116 MS
QRS AXIS: 81 DEGREES
QRSD INTERVAL: 94 MS
QT INTERVAL: 442 MS
QTC INTERVAL: 452 MS
T WAVE AXIS: 62 DEGREES
VENTRICULAR RATE: 63 BPM

## 2022-08-01 PROCEDURE — 93010 ELECTROCARDIOGRAM REPORT: CPT | Performed by: INTERNAL MEDICINE

## 2022-08-02 ENCOUNTER — HOSPITAL ENCOUNTER (OUTPATIENT)
Dept: CT IMAGING | Facility: HOSPITAL | Age: 42
Discharge: HOME | End: 2022-08-02
Payer: COMMERCIAL

## 2022-08-02 DIAGNOSIS — R10.30 LOWER ABDOMINAL PAIN, UNSPECIFIED: ICD-10-CM

## 2022-08-02 PROCEDURE — 74176 CT ABD & PELVIS W/O CONTRAST: CPT

## 2022-09-28 ENCOUNTER — HOSPITAL ENCOUNTER (OUTPATIENT)
Dept: NON INVASIVE DIAGNOSTICS | Facility: HOSPITAL | Age: 42
Discharge: HOME | End: 2022-09-28
Payer: COMMERCIAL

## 2022-09-28 VITALS — HEART RATE: 72 BPM | HEIGHT: 65 IN | BODY MASS INDEX: 34.82 KG/M2 | WEIGHT: 209 LBS

## 2022-09-28 DIAGNOSIS — G45.9 TIA (TRANSIENT ISCHEMIC ATTACK): ICD-10-CM

## 2022-09-28 LAB
AORTIC ROOT: 3 CM
APICAL FOUR CHAMBER EJECTION FRACTION: 76 %
E WAVE DECELERATION TIME: 267 MS
FRACTIONAL SHORTENING: 26 % (ref 28–44)
INTERVENTRICULAR SEPTUM IN DIASTOLE (PARASTERNAL SHORT AXIS VIEW): 0.8 CM
INTERVENTRICULAR SEPTUM: 0.8 CM (ref 0.6–1.1)
IVC: 2.5 MM
LAAS-AP2: 11.4 CM2
LAAS-AP4: 10.4 CM2
LEFT ATRIUM SIZE: 3 CM
LEFT INTERNAL DIMENSION IN SYSTOLE: 3.2 CM (ref 2.1–4)
LEFT VENTRICULAR INTERNAL DIMENSION IN DIASTOLE: 4.3 CM (ref 3.5–6)
LEFT VENTRICULAR POSTERIOR WALL IN END DIASTOLE: 0.7 CM
LEFT VENTRICULAR STROKE VOLUME: 43 ML
LVSV (TEICH): 43 ML
MV E'TISSUE VEL-LAT: 13 CM/S
MV E'TISSUE VEL-SEP: 10 CM/S
MV PEAK A VEL: 0.67 M/S
MV PEAK E VEL: 60 CM/S
MV STENOSIS PRESSURE HALF TIME: 77 MS
MV VALVE AREA P 1/2 METHOD: 2.86 CM2
RIGHT ATRIAL 2D VOLUME: 15 ML
RIGHT ATRIUM AREA SYSTOLE A4C: 8.1 CM2
RIGHT VENTRICLE ID DIMENSION: 3 CM
SL CV LEFT ATRIUM LENGTH A2C: 4.6 CM
SL CV LV EF: 60
SL CV PED ECHO LEFT VENTRICLE DIASTOLIC VOLUME (MOD BIPLANE) 2D: 84 ML
SL CV PED ECHO LEFT VENTRICLE SYSTOLIC VOLUME (MOD BIPLANE) 2D: 41 ML
TRICUSPID VALVE PEAK REGURGITATION VELOCITY: 1.84 M/S

## 2022-09-28 PROCEDURE — 93306 TTE W/DOPPLER COMPLETE: CPT

## 2022-09-28 PROCEDURE — 93306 TTE W/DOPPLER COMPLETE: CPT | Performed by: INTERNAL MEDICINE

## 2024-06-05 ENCOUNTER — OFFICE VISIT (OUTPATIENT)
Dept: URGENT CARE | Facility: CLINIC | Age: 44
End: 2024-06-05
Payer: COMMERCIAL

## 2024-06-05 ENCOUNTER — APPOINTMENT (OUTPATIENT)
Dept: RADIOLOGY | Facility: CLINIC | Age: 44
End: 2024-06-05
Payer: COMMERCIAL

## 2024-06-05 VITALS
HEIGHT: 67 IN | BODY MASS INDEX: 24.08 KG/M2 | RESPIRATION RATE: 16 BRPM | OXYGEN SATURATION: 96 % | WEIGHT: 153.4 LBS | SYSTOLIC BLOOD PRESSURE: 117 MMHG | DIASTOLIC BLOOD PRESSURE: 71 MMHG | TEMPERATURE: 97.4 F | HEART RATE: 71 BPM

## 2024-06-05 DIAGNOSIS — S93.401A SPRAIN OF RIGHT ANKLE, UNSPECIFIED LIGAMENT, INITIAL ENCOUNTER: Primary | ICD-10-CM

## 2024-06-05 DIAGNOSIS — M25.571 RIGHT ANKLE PAIN, UNSPECIFIED CHRONICITY: ICD-10-CM

## 2024-06-05 PROCEDURE — 73610 X-RAY EXAM OF ANKLE: CPT

## 2024-06-05 PROCEDURE — 99213 OFFICE O/P EST LOW 20 MIN: CPT

## 2024-06-05 RX ORDER — AZELASTINE 1 MG/ML
1 SPRAY, METERED NASAL
COMMUNITY
Start: 2024-03-22

## 2024-06-05 NOTE — PROGRESS NOTES
[facility]        NAME: Sylvia Doran is a 44 y.o. female  : 1980    MRN: [id number]  DATE: 2024  TIME: 8:45 AM    Assessment and Plan   Sprain of right ankle, unspecified ligament, initial encounter [S93.401A]  1. Sprain of right ankle, unspecified ligament, initial encounter  Ambulatory Referral to Orthopedic Surgery      2. Right ankle pain, unspecified chronicity  XR ankle 3+ vw right      Supportive care discussed. Continue with ankle brace, elevation, and ice. Ibuprofen as needed for pain. Follow up with orthopedic surgery.   Patient Instructions   Initial read of Xray appears negative, but still waiting on official impression.   Will call patient if there are any acute findings.   Continue with Tylenol for pain control.   Apply ice and heat for comfort.   Referral provided for orthopedic evaluation.  Follow up with PCP in 3-5 days.  Proceed to ER if symptoms worsen.    If tests have been performed at [facility], our office will contact you with results if changes need to be made to the care plan discussed with you at the visit.  You can review your full results on [facility] MyChart.    Chief Complaint     Chief Complaint   Patient presents with    Ankle Pain     Right ankle pain and swelling starting 2 weeks ago. Swelled while walking, but denies any significant injuries, possibly twisted it on uneven pavement but not sure.      History of Present Illness     Patient is a 44-year-old female presenting complaining of right ankle pain x 2 weeks. She denies any true injury, but states she has been walking on uneven ground at work and noticed swelling. She denies any previous injury to the ankle.  She denies any bruising, redness, or other changes.   She has tried elevating, icing, and using a brace.   Denies any numbness, tingling, or loss of sensation.     Ankle Pain         Review of Systems     Review of Systems   Constitutional:  Negative for chills and fever.   HENT: Negative.      Eyes: Negative.    Respiratory:  Negative for cough, shortness of breath, wheezing and stridor.    Cardiovascular: Negative.    Gastrointestinal: Negative.    Genitourinary: Negative.    Musculoskeletal:  Positive for arthralgias and joint swelling. Negative for back pain.   Skin:  Negative for color change and rash.   Neurological:  Negative for seizures and syncope.   All other systems reviewed and are negative.      Current Medications       Current Outpatient Medications:     azelastine (ASTELIN) 0.1 % nasal spray, 1 spray into each nostril, Disp: , Rfl:     Multiple Vitamin (MULTIVITAMIN PO), Take by mouth, Disp: , Rfl:     fexofenadine (ALLEGRA) 180 MG tablet, Take 180 mg by mouth daily (Patient not taking: Reported on 7/6/2022), Disp: , Rfl:     montelukast (SINGULAIR) 10 mg tablet, Take 10 mg by mouth daily at bedtime (Patient not taking: Reported on 7/6/2022), Disp: , Rfl:     Current Allergies     Allergies as of 06/05/2024 - Reviewed 06/05/2024   Allergen Reaction Noted    Escitalopram Other (See Comments) 10/26/2010    Fluoxetine Other (See Comments) 10/26/2010    Other Other (See Comments) 06/05/2024    Sertraline Other (See Comments) 05/28/2003    Morphine Rash 05/21/2010    Vancomycin Rash 06/28/2020            The following portions of the patient's history were reviewed and updated as appropriate: allergies, current medications, past family history, past medical history, past social history, past surgical history and problem list.     Past Medical History:   Diagnosis Date    Depression        Past Surgical History:   Procedure Laterality Date    DILATION AND CURETTAGE OF UTERUS  2008    DILATION AND CURETTAGE OF UTERUS  2009    ENDOMETRIAL ABLATION      HERNIA REPAIR      MOUTH SURGERY      TUBAL LIGATION      TUMOR REMOVAL      from tongue    WISDOM TOOTH EXTRACTION         Family History   Problem Relation Age of Onset    Hyperlipidemia Mother     No Known Problems Father          Medications  "have been verified.        Objective   /71   Pulse 71   Temp (!) 97.4 °F (36.3 °C)   Resp 16   Ht 5' 7\" (1.702 m)   Wt 69.6 kg (153 lb 6.4 oz)   SpO2 96%   BMI 24.03 kg/m²   No LMP recorded. Patient has had an ablation.       Physical Exam     Physical Exam  Vitals and nursing note reviewed.   Constitutional:       General: She is not in acute distress.     Appearance: Normal appearance. She is normal weight. She is not ill-appearing, toxic-appearing or diaphoretic.   HENT:      Head: Normocephalic and atraumatic.      Right Ear: External ear normal.      Left Ear: External ear normal.      Nose: Nose normal.      Mouth/Throat:      Mouth: Mucous membranes are moist.   Eyes:      Conjunctiva/sclera: Conjunctivae normal.   Cardiovascular:      Rate and Rhythm: Normal rate.      Pulses: Normal pulses.   Pulmonary:      Effort: Pulmonary effort is normal.      Breath sounds: Normal breath sounds.   Musculoskeletal:         General: Swelling (on lateral aspect of right ankle) and tenderness (to palpation over lateral posterior talofibular ligament) present. No deformity or signs of injury. Normal range of motion.      Cervical back: Normal range of motion.      Right lower leg: No swelling, deformity, lacerations, tenderness or bony tenderness. No edema.      Left lower leg: No swelling, deformity, lacerations, tenderness or bony tenderness. No edema.      Right ankle: Swelling present. No deformity, ecchymosis or lacerations. Tenderness present over the posterior TF ligament. Normal range of motion.      Right foot: Normal.   Skin:     General: Skin is warm and dry.      Capillary Refill: Capillary refill takes less than 2 seconds.   Neurological:      General: No focal deficit present.      Mental Status: She is alert. Mental status is at baseline.   Psychiatric:         Mood and Affect: Mood normal.         Behavior: Behavior normal.                   "

## 2024-08-11 ENCOUNTER — OFFICE VISIT (OUTPATIENT)
Dept: URGENT CARE | Facility: CLINIC | Age: 44
End: 2024-08-11
Payer: COMMERCIAL

## 2024-08-11 VITALS
DIASTOLIC BLOOD PRESSURE: 72 MMHG | OXYGEN SATURATION: 96 % | TEMPERATURE: 97.1 F | HEART RATE: 104 BPM | BODY MASS INDEX: 22.29 KG/M2 | SYSTOLIC BLOOD PRESSURE: 129 MMHG | RESPIRATION RATE: 18 BRPM | HEIGHT: 67 IN | WEIGHT: 142 LBS

## 2024-08-11 DIAGNOSIS — J01.00 ACUTE NON-RECURRENT MAXILLARY SINUSITIS: Primary | ICD-10-CM

## 2024-08-11 PROCEDURE — G0382 LEV 3 HOSP TYPE B ED VISIT: HCPCS

## 2024-08-11 PROCEDURE — S9083 URGENT CARE CENTER GLOBAL: HCPCS

## 2024-08-11 RX ORDER — FLUTICASONE PROPIONATE 50 MCG
2 SPRAY, SUSPENSION (ML) NASAL DAILY
Qty: 9.9 ML | Refills: 2 | Status: SHIPPED | OUTPATIENT
Start: 2024-08-11 | End: 2024-09-10

## 2024-08-11 NOTE — PROGRESS NOTES
"  [facility]        NAME: Sylvia Doran is a 44 y.o. female  : 1980    MRN: [id number]  DATE: 2024  TIME: 3:13 PM    Assessment and Plan   Acute non-recurrent maxillary sinusitis [J01.00]  1. Acute non-recurrent maxillary sinusitis  Ambulatory Referral to Otolaryngology    fluticasone (FLONASE) 50 mcg/act nasal spray    amoxicillin-clavulanate (AUGMENTIN) 875-125 mg per tablet            Patient Instructions   Saline sinus rinse (like NeilMed Sinus Rinse) twice daily followed by flonase 1 spray twice daily - only use sterile or distilled water with sinus rinse to avoid further infections.  Tylenol Sinus and Mucinex over the counter  Warm compresses over sinuses  Steam treatment (practice proper safety precautions when handing hot liquids/steam)  Over the counter saline nasal spray  Follow up with PCP in 3-5 days.  Proceed to  ER if symptoms worsen.    Eat yogurt with live and active cultures and/or take a probiotic at least 3 hours before or after antibiotic dose. Monitor stool for diarrhea and/or blood. If this occurs, contact primary care doctor ASAP.       Follow up with PCP in 3-5 days.  Proceed to  ER if symptoms worsen.    If tests have been performed at [facility], our office will contact you with results if changes need to be made to the care plan discussed with you at the visit.  You can review your full results on [facility] MyChart.    Chief Complaint     Chief Complaint   Patient presents with    Facial Pain     Pt reports left sided facial/sinus pain and left sided ear pain/throat for the past week    Rash     Pt reports rash on R forearm area for the past week after picking weeds outside         History of Present Illness       She admits that that she is having neck pain/ ear pain that started two days ago, and admits to ear pain and \"always having sinus infection\".   She admits to sinus infection for one month and she admits she is using azelastine and benadryl. She denies " seasonal allergies   She denies being sick in the past month, and she admits that she has constant sinus infections. She admits to seeing an ENT and an allergist  She denies having any scans of her sinuses and she denies any antibiotics in the past few months. She admits to recurrent sinusitis in the past  She denies any fevers for chills, and she denies changes in appetite.         Review of Systems   Review of Systems   Constitutional:  Negative for chills and fever.   HENT:  Positive for sinus pain. Negative for ear pain and sore throat.    Eyes:  Negative for pain and visual disturbance.   Respiratory:  Negative for cough and shortness of breath.    Cardiovascular:  Negative for chest pain and palpitations.   Gastrointestinal:  Negative for abdominal pain and vomiting.   Genitourinary:  Negative for dysuria and hematuria.   Musculoskeletal:  Negative for arthralgias and back pain.   Skin:  Positive for rash. Negative for color change.   Neurological:  Negative for seizures and syncope.   All other systems reviewed and are negative.        Current Medications       Current Outpatient Medications:     amoxicillin-clavulanate (AUGMENTIN) 875-125 mg per tablet, Take 1 tablet by mouth every 12 (twelve) hours for 10 days, Disp: 20 tablet, Rfl: 0    azelastine (ASTELIN) 0.1 % nasal spray, 1 spray into each nostril, Disp: , Rfl:     fluticasone (FLONASE) 50 mcg/act nasal spray, 2 sprays into each nostril daily, Disp: 9.9 mL, Rfl: 2    Multiple Vitamin (MULTIVITAMIN PO), Take by mouth, Disp: , Rfl:     fexofenadine (ALLEGRA) 180 MG tablet, Take 180 mg by mouth daily (Patient not taking: Reported on 7/6/2022), Disp: , Rfl:     montelukast (SINGULAIR) 10 mg tablet, Take 10 mg by mouth daily at bedtime (Patient not taking: Reported on 7/6/2022), Disp: , Rfl:     Current Allergies     Allergies as of 08/11/2024 - Reviewed 08/11/2024   Allergen Reaction Noted    Escitalopram Other (See Comments) 10/26/2010    Fluoxetine Other  "(See Comments) 10/26/2010    Other Other (See Comments) 06/05/2024    Sertraline Other (See Comments) 05/28/2003    Morphine Rash 05/21/2010    Vancomycin Rash 06/28/2020            The following portions of the patient's history were reviewed and updated as appropriate: allergies, current medications, past family history, past medical history, past social history, past surgical history and problem list.     Past Medical History:   Diagnosis Date    Depression        Past Surgical History:   Procedure Laterality Date    DILATION AND CURETTAGE OF UTERUS  2008    DILATION AND CURETTAGE OF UTERUS  2009    ENDOMETRIAL ABLATION      HERNIA REPAIR      MOUTH SURGERY      TUBAL LIGATION      TUMOR REMOVAL      from tongue    WISDOM TOOTH EXTRACTION         Family History   Problem Relation Age of Onset    Hyperlipidemia Mother     No Known Problems Father          Medications have been verified.        Objective   /72   Pulse 104   Temp (!) 97.1 °F (36.2 °C)   Resp 18   Ht 5' 7\" (1.702 m)   Wt 64.4 kg (142 lb)   SpO2 96%   BMI 22.24 kg/m²   No LMP recorded. Patient has had an ablation.       Physical Exam     Physical Exam  Constitutional:       General: She is not in acute distress.     Appearance: Normal appearance. She is normal weight. She is not toxic-appearing.   HENT:      Head: Normocephalic and atraumatic.      Right Ear: Tympanic membrane, ear canal and external ear normal. There is no impacted cerumen.      Left Ear: Tympanic membrane, ear canal and external ear normal. There is no impacted cerumen.      Nose: Congestion present. No rhinorrhea.      Right Sinus: Maxillary sinus tenderness and frontal sinus tenderness present.      Left Sinus: Maxillary sinus tenderness and frontal sinus tenderness present.      Mouth/Throat:      Mouth: Mucous membranes are moist.      Pharynx: Posterior oropharyngeal erythema present. No oropharyngeal exudate.   Eyes:      General: No scleral icterus.        Right " eye: No discharge.         Left eye: No discharge.      Extraocular Movements: Extraocular movements intact.      Conjunctiva/sclera: Conjunctivae normal.      Pupils: Pupils are equal, round, and reactive to light.   Neck:      Vascular: No carotid bruit.   Cardiovascular:      Rate and Rhythm: Normal rate and regular rhythm.      Pulses: Normal pulses.      Heart sounds: Normal heart sounds. No murmur heard.     No friction rub. No gallop.   Pulmonary:      Effort: Pulmonary effort is normal. No respiratory distress.      Breath sounds: Normal breath sounds. No stridor. No wheezing, rhonchi or rales.   Chest:      Chest wall: No tenderness.   Musculoskeletal:      Cervical back: Normal range of motion and neck supple. No rigidity or tenderness.   Lymphadenopathy:      Cervical: No cervical adenopathy.   Neurological:      Mental Status: She is alert.

## 2024-11-25 ENCOUNTER — HOSPITAL ENCOUNTER (OUTPATIENT)
Dept: CT IMAGING | Facility: HOSPITAL | Age: 44
Discharge: HOME | End: 2024-11-25
Payer: COMMERCIAL

## 2024-11-25 DIAGNOSIS — J32.4 CHRONIC PANSINUSITIS: ICD-10-CM

## 2024-11-25 PROCEDURE — 70486 CT MAXILLOFACIAL W/O DYE: CPT

## 2025-02-23 ENCOUNTER — OFFICE VISIT (OUTPATIENT)
Dept: URGENT CARE | Facility: CLINIC | Age: 45
End: 2025-02-23
Payer: COMMERCIAL

## 2025-02-23 VITALS
HEART RATE: 111 BPM | TEMPERATURE: 97.8 F | BODY MASS INDEX: 21.56 KG/M2 | OXYGEN SATURATION: 99 % | HEIGHT: 71 IN | DIASTOLIC BLOOD PRESSURE: 72 MMHG | SYSTOLIC BLOOD PRESSURE: 122 MMHG | WEIGHT: 154 LBS | RESPIRATION RATE: 16 BRPM

## 2025-02-23 DIAGNOSIS — H69.92 DYSFUNCTION OF LEFT EUSTACHIAN TUBE: Primary | ICD-10-CM

## 2025-02-23 PROCEDURE — 99213 OFFICE O/P EST LOW 20 MIN: CPT | Performed by: PHYSICIAN ASSISTANT

## 2025-02-23 RX ORDER — PREDNISONE 10 MG/1
TABLET ORAL
Qty: 21 TABLET | Refills: 0 | Status: SHIPPED | OUTPATIENT
Start: 2025-02-23 | End: 2025-03-09 | Stop reason: SDUPTHER

## 2025-02-23 NOTE — PROGRESS NOTES
[facility]        NAME: Sylvia Doran is a 44 y.o. female  : 1980    MRN: [id number]  DATE: 2025  TIME: 2:07 PM    Assessment and Plan   Dysfunction of left eustachian tube [H69.92]  1. Dysfunction of left eustachian tube  predniSONE 10 mg tablet          Results        Patient Instructions     Assessment & Plan    Medication as prescribed denilson thing in the morning with food.  May take tylenol for pain. Avoid NSAIDS.   Follow up with PCP in 3-5 days.  Proceed to  ER if symptoms worsen.    If tests have been performed at [facility], our office will contact you with results if changes need to be made to the care plan discussed with you at the visit.  [facility]    Chief Complaint     Chief Complaint   Patient presents with    Earache     Started yesterday with left ear pain. Continuously getting worse.           History of Present Illness     History of Present Illness      Earache   There is pain in the left ear. This is a new problem. The current episode started yesterday. The problem has been gradually worsening. There has been no fever. Pertinent negatives include no abdominal pain, coughing, diarrhea, ear discharge, hearing loss, rash, rhinorrhea, sore throat or vomiting. Treatments tried: benadryl; dayquil.       Review of Systems   Review of Systems   Constitutional:  Negative for chills and fever.   HENT:  Positive for ear pain. Negative for congestion, ear discharge, hearing loss, postnasal drip, rhinorrhea, sinus pressure, sinus pain, sore throat, tinnitus and trouble swallowing.    Respiratory:  Negative for cough and shortness of breath.    Cardiovascular:  Negative for chest pain and palpitations.   Gastrointestinal:  Negative for abdominal pain, constipation, diarrhea, nausea and vomiting.   Musculoskeletal:  Negative for myalgias.   Skin:  Negative for rash.         Current Medications       Current Outpatient Medications:      predniSONE 10 mg tablet, Take 6 pills day one, 5 pills day 2, 4 pills day 3, 3 pills day 4, 2 pills day 5, and 1 pill day 6., Disp: 21 tablet, Rfl: 0    azelastine (ASTELIN) 0.1 % nasal spray, 1 spray into each nostril (Patient not taking: Reported on 2/23/2025), Disp: , Rfl:     fexofenadine (ALLEGRA) 180 MG tablet, Take 180 mg by mouth daily (Patient not taking: Reported on 2/23/2025), Disp: , Rfl:     fluticasone (FLONASE) 50 mcg/act nasal spray, 2 sprays into each nostril daily (Patient not taking: Reported on 2/23/2025), Disp: 9.9 mL, Rfl: 2    montelukast (SINGULAIR) 10 mg tablet, Take 10 mg by mouth daily at bedtime (Patient not taking: Reported on 2/23/2025), Disp: , Rfl:     Multiple Vitamin (MULTIVITAMIN PO), Take by mouth (Patient not taking: Reported on 2/23/2025), Disp: , Rfl:     Current Allergies     Allergies as of 02/23/2025 - Reviewed 08/11/2024   Allergen Reaction Noted    Escitalopram Other (See Comments) 10/26/2010    Fluoxetine Other (See Comments) 10/26/2010    Other Other (See Comments) 06/05/2024    Sertraline Other (See Comments) 05/28/2003    Morphine Rash 05/21/2010    Vancomycin Rash 06/28/2020            The following portions of the patient's history were reviewed and updated as appropriate: allergies, current medications, past family history, past medical history, past social history, past surgical history and problem list.     Past Medical History:   Diagnosis Date    Depression        Past Surgical History:   Procedure Laterality Date    DILATION AND CURETTAGE OF UTERUS  2008    DILATION AND CURETTAGE OF UTERUS  2009    ENDOMETRIAL ABLATION      HERNIA REPAIR      MOUTH SURGERY      TUBAL LIGATION      TUMOR REMOVAL      from tongue    WISDOM TOOTH EXTRACTION         Family History   Problem Relation Age of Onset    Hyperlipidemia Mother     No Known Problems Father          Medications have been verified.        Objective   /72   Pulse (!) 111   Temp 97.8 °F (36.6 °C)    "Resp 16   Ht 5' 11\" (1.803 m)   Wt 69.9 kg (154 lb)   SpO2 99%   BMI 21.48 kg/m²   No LMP recorded. Patient has had an ablation.       Physical Exam     Physical Exam  Vitals reviewed.   Constitutional:       General: She is not in acute distress.     Appearance: She is well-developed. She is not diaphoretic.   HENT:      Head: Normocephalic and atraumatic.      Right Ear: Tympanic membrane, ear canal and external ear normal.      Left Ear: Tympanic membrane, ear canal and external ear normal.      Nose: Nose normal.      Mouth/Throat:      Pharynx: No oropharyngeal exudate or posterior oropharyngeal erythema.   Eyes:      General:         Right eye: No discharge.         Left eye: No discharge.   Cardiovascular:      Rate and Rhythm: Normal rate and regular rhythm.      Heart sounds: Normal heart sounds. No murmur heard.     No friction rub. No gallop.   Pulmonary:      Effort: Pulmonary effort is normal. No respiratory distress.      Breath sounds: Normal breath sounds. No wheezing, rhonchi or rales.   Lymphadenopathy:      Cervical: No cervical adenopathy.   Skin:     General: Skin is warm.      Findings: No rash.   Neurological:      Mental Status: She is alert.   Psychiatric:         Behavior: Behavior normal.         Thought Content: Thought content normal.         Judgment: Judgment normal.                     "

## 2025-02-23 NOTE — PATIENT INSTRUCTIONS
Medication as prescribed denilson thing in the morning with food.  May take tylenol for pain. Avoid NSAIDS.   Follow up with PCP in 3-5 days.  Proceed to  ER if symptoms worsen.    If tests have been performed at [facility], our office will contact you with results if changes need to be made to the care plan discussed with you at the visit.  You can review your full results on [facility] MyChart.

## 2025-03-09 ENCOUNTER — OFFICE VISIT (OUTPATIENT)
Dept: URGENT CARE | Facility: CLINIC | Age: 45
End: 2025-03-09
Payer: COMMERCIAL

## 2025-03-09 VITALS
WEIGHT: 155 LBS | SYSTOLIC BLOOD PRESSURE: 108 MMHG | HEART RATE: 67 BPM | BODY MASS INDEX: 24.33 KG/M2 | TEMPERATURE: 97.6 F | HEIGHT: 67 IN | OXYGEN SATURATION: 98 % | RESPIRATION RATE: 17 BRPM | DIASTOLIC BLOOD PRESSURE: 60 MMHG

## 2025-03-09 DIAGNOSIS — J01.00 ACUTE NON-RECURRENT MAXILLARY SINUSITIS: Primary | ICD-10-CM

## 2025-03-09 DIAGNOSIS — H65.91 RIGHT OTITIS MEDIA WITH EFFUSION: ICD-10-CM

## 2025-03-09 PROCEDURE — 99213 OFFICE O/P EST LOW 20 MIN: CPT

## 2025-03-09 RX ORDER — PREDNISONE 20 MG/1
40 TABLET ORAL DAILY
Qty: 10 TABLET | Refills: 0 | Status: SHIPPED | OUTPATIENT
Start: 2025-03-09 | End: 2025-03-14

## 2025-03-09 NOTE — PROGRESS NOTES
[facility]        NAME: Sylvia Doran is a 44 y.o. female  : 1980    MRN: [id number]  DATE: 2025  TIME: 12:48 PM    Assessment and Plan   Acute non-recurrent maxillary sinusitis [J01.00]  1. Acute non-recurrent maxillary sinusitis  amoxicillin-clavulanate (AUGMENTIN) 875-125 mg per tablet    predniSONE 20 mg tablet      2. Right otitis media with effusion  predniSONE 20 mg tablet        Clinical findings correlate with sinusitis and OME, will treat with Augmentin and another course of oral steroids.  Flonase nasal spray. Encouraged continued supportive measures.  Follow up with PCP in 3-5 days or proceed to emergency department for worsening symptoms.  Patient verbalized understanding of instructions given.         Patient Instructions     Take antibiotic as prescribed  Take oral steroids as prescribed  Flonase nasal spray   Continue with supportive measures, OTC Tylenol/Ibuprofen, nasal decongestants, and cough suppressants   Cool mist humidifiers, throat lozenges, increased fluid intake and rest   Follow up with PCP in 3-5 days  Present to ER if symptoms worsen       If tests have been performed at [facility], our office will contact you with results if changes need to be made to the care plan discussed with you at the visit.  [facility]    Chief Complaint     Chief Complaint   Patient presents with    Earache     C/o right earache and sinus pressure. Onset a month ago.          History of Present Illness       44-year-old female with no significant past medical history presents with complaints of ongoing nasal congestion and sinus pressure x 1 month.  Also reporting right sided earache x 2 days when previously reporting left-sided earache.  Completed course of oral steroids about 2 weeks ago with some relief.  Denies fever, chills, vomiting, or diarrhea.  No known sick contacts or exposures.  Using Jodi pot.    Earache   Pertinent negatives  include no abdominal pain, coughing, diarrhea, ear discharge, rash, sore throat or vomiting.       Review of Systems   Review of Systems   Constitutional:  Negative for chills and fever.   HENT:  Positive for congestion, ear pain, postnasal drip, sinus pressure and sinus pain. Negative for ear discharge, sore throat, trouble swallowing and voice change.    Eyes:  Negative for discharge.   Respiratory:  Negative for cough, shortness of breath and wheezing.    Cardiovascular:  Negative for chest pain.   Gastrointestinal:  Negative for abdominal pain, diarrhea, nausea and vomiting.   Skin:  Negative for rash.         Current Medications       Current Outpatient Medications:     amoxicillin-clavulanate (AUGMENTIN) 875-125 mg per tablet, Take 1 tablet by mouth every 12 (twelve) hours for 7 days, Disp: 14 tablet, Rfl: 0    predniSONE 20 mg tablet, Take 2 tablets (40 mg total) by mouth daily for 5 days, Disp: 10 tablet, Rfl: 0    azelastine (ASTELIN) 0.1 % nasal spray, 1 spray into each nostril (Patient not taking: Reported on 2/23/2025), Disp: , Rfl:     fexofenadine (ALLEGRA) 180 MG tablet, Take 180 mg by mouth daily (Patient not taking: Reported on 2/23/2025), Disp: , Rfl:     fluticasone (FLONASE) 50 mcg/act nasal spray, 2 sprays into each nostril daily (Patient not taking: Reported on 2/23/2025), Disp: 9.9 mL, Rfl: 2    montelukast (SINGULAIR) 10 mg tablet, Take 10 mg by mouth daily at bedtime (Patient not taking: Reported on 2/23/2025), Disp: , Rfl:     Multiple Vitamin (MULTIVITAMIN PO), Take by mouth (Patient not taking: Reported on 2/23/2025), Disp: , Rfl:     Current Allergies     Allergies as of 03/09/2025 - Reviewed 03/09/2025   Allergen Reaction Noted    Escitalopram Other (See Comments) 10/26/2010    Fluoxetine Other (See Comments) 10/26/2010    Other Other (See Comments) 06/05/2024    Sertraline Other (See Comments) 05/28/2003    Morphine Rash 05/21/2010    Vancomycin Rash 06/28/2020            The following  "portions of the patient's history were reviewed and updated as appropriate: allergies, current medications, past family history, past medical history, past social history, past surgical history and problem list.     Past Medical History:   Diagnosis Date    Depression        Past Surgical History:   Procedure Laterality Date    DILATION AND CURETTAGE OF UTERUS  2008    DILATION AND CURETTAGE OF UTERUS  2009    ENDOMETRIAL ABLATION      HERNIA REPAIR      MOUTH SURGERY      TUBAL LIGATION      TUMOR REMOVAL      from tongue    WISDOM TOOTH EXTRACTION         Family History   Problem Relation Age of Onset    Hyperlipidemia Mother     No Known Problems Father          Medications have been verified.        Objective   /60   Pulse 67   Temp 97.6 °F (36.4 °C)   Resp 17   Ht 5' 7\" (1.702 m)   Wt 70.3 kg (155 lb)   SpO2 98%   BMI 24.28 kg/m²   No LMP recorded. Patient has had an ablation.       Physical Exam     Physical Exam  Vitals and nursing note reviewed.   Constitutional:       General: She is not in acute distress.     Appearance: She is not toxic-appearing.   HENT:      Head: Normocephalic.      Right Ear: Ear canal and external ear normal. A middle ear effusion is present. Tympanic membrane is not erythematous or bulging.      Left Ear: Tympanic membrane, ear canal and external ear normal.      Nose: Congestion present.      Right Sinus: Maxillary sinus tenderness present. No frontal sinus tenderness.      Left Sinus: Maxillary sinus tenderness present. No frontal sinus tenderness.      Mouth/Throat:      Mouth: Mucous membranes are moist.      Pharynx: Oropharynx is clear.   Eyes:      Conjunctiva/sclera: Conjunctivae normal.   Cardiovascular:      Rate and Rhythm: Normal rate and regular rhythm.      Heart sounds: Normal heart sounds.   Pulmonary:      Effort: Pulmonary effort is normal. No respiratory distress.      Breath sounds: Normal breath sounds. No stridor. No wheezing, rhonchi or rales. "   Lymphadenopathy:      Cervical: No cervical adenopathy.   Skin:     General: Skin is warm and dry.   Neurological:      Mental Status: She is alert and oriented to person, place, and time.      Gait: Gait is intact.   Psychiatric:         Mood and Affect: Mood normal.         Behavior: Behavior normal.

## 2025-03-09 NOTE — PATIENT INSTRUCTIONS
Take antibiotic as prescribed  Take oral steroids as prescribed  Flonase nasal spray   Continue with supportive measures, OTC Tylenol/Ibuprofen, nasal decongestants, and cough suppressants   Cool mist humidifiers, throat lozenges, increased fluid intake and rest   Follow up with PCP in 3-5 days  Present to ER if symptoms worsen       If tests have been performed at [facility], our office will contact you with results if changes need to be made to the care plan discussed with you at the visit.  You can review your full results on [facility] My[facility].

## 2025-08-07 ENCOUNTER — HOSPITAL ENCOUNTER (EMERGENCY)
Facility: HOSPITAL | Age: 45
Discharge: HOME | End: 2025-08-07
Attending: EMERGENCY MEDICINE | Admitting: EMERGENCY MEDICINE
Payer: COMMERCIAL

## 2025-08-07 VITALS
RESPIRATION RATE: 18 BRPM | TEMPERATURE: 97.1 F | HEART RATE: 62 BPM | OXYGEN SATURATION: 97 % | WEIGHT: 138.89 LBS | SYSTOLIC BLOOD PRESSURE: 120 MMHG | BODY MASS INDEX: 21.75 KG/M2 | DIASTOLIC BLOOD PRESSURE: 70 MMHG

## 2025-08-07 DIAGNOSIS — H10.212 CHEMICAL CONJUNCTIVITIS OF LEFT EYE: Primary | ICD-10-CM

## 2025-08-07 PROCEDURE — 99283 EMERGENCY DEPT VISIT LOW MDM: CPT

## 2025-08-07 PROCEDURE — 99284 EMERGENCY DEPT VISIT MOD MDM: CPT | Performed by: EMERGENCY MEDICINE

## 2025-08-07 RX ORDER — SULFACETAMIDE SODIUM 100 MG/ML
1 SOLUTION/ DROPS OPHTHALMIC ONCE
Status: COMPLETED | OUTPATIENT
Start: 2025-08-07 | End: 2025-08-07

## 2025-08-07 RX ORDER — KETOROLAC TROMETHAMINE 5 MG/ML
1 SOLUTION OPHTHALMIC ONCE
Status: COMPLETED | OUTPATIENT
Start: 2025-08-07 | End: 2025-08-07

## 2025-08-07 RX ORDER — SULFACETAMIDE SODIUM 100 MG/G
OINTMENT OPHTHALMIC EVERY 6 HOURS
Qty: 3.5 G | Refills: 0 | Status: SHIPPED | OUTPATIENT
Start: 2025-08-07 | End: 2025-08-12

## 2025-08-07 RX ORDER — KETOROLAC TROMETHAMINE 5 MG/ML
1 SOLUTION OPHTHALMIC EVERY 6 HOURS
Qty: 0.6 ML | Refills: 0 | Status: SHIPPED | OUTPATIENT
Start: 2025-08-07 | End: 2025-08-10

## 2025-08-07 RX ADMIN — KETOROLAC TROMETHAMINE 1 DROP: 5 SOLUTION OPHTHALMIC at 19:31

## 2025-08-07 RX ADMIN — SULFACETAMIDE SODIUM 1 DROP: 100 SOLUTION/ DROPS OPHTHALMIC at 19:31
